# Patient Record
Sex: FEMALE | Race: WHITE | NOT HISPANIC OR LATINO | Employment: UNEMPLOYED | URBAN - METROPOLITAN AREA
[De-identification: names, ages, dates, MRNs, and addresses within clinical notes are randomized per-mention and may not be internally consistent; named-entity substitution may affect disease eponyms.]

---
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
## 2024-01-11 ENCOUNTER — OFFICE VISIT (OUTPATIENT)
Dept: OBGYN CLINIC | Facility: CLINIC | Age: 19
End: 2024-01-11
Payer: COMMERCIAL

## 2024-01-11 VITALS
BODY MASS INDEX: 21.83 KG/M2 | WEIGHT: 118.6 LBS | HEIGHT: 62 IN | DIASTOLIC BLOOD PRESSURE: 76 MMHG | SYSTOLIC BLOOD PRESSURE: 122 MMHG

## 2024-01-11 DIAGNOSIS — N92.0 MENORRHAGIA WITH REGULAR CYCLE: ICD-10-CM

## 2024-01-11 DIAGNOSIS — Z01.419 ENCOUNTER FOR GYNECOLOGICAL EXAMINATION WITHOUT ABNORMAL FINDING: Primary | ICD-10-CM

## 2024-01-11 DIAGNOSIS — Z00.00 HEALTHCARE MAINTENANCE: ICD-10-CM

## 2024-01-11 DIAGNOSIS — N94.6 DYSMENORRHEA: ICD-10-CM

## 2024-01-11 DIAGNOSIS — Z11.3 SCREENING EXAMINATION FOR STD (SEXUALLY TRANSMITTED DISEASE): ICD-10-CM

## 2024-01-11 PROCEDURE — 87591 N.GONORRHOEAE DNA AMP PROB: CPT | Performed by: NURSE PRACTITIONER

## 2024-01-11 PROCEDURE — 87491 CHLMYD TRACH DNA AMP PROBE: CPT | Performed by: NURSE PRACTITIONER

## 2024-01-11 PROCEDURE — 99385 PREV VISIT NEW AGE 18-39: CPT | Performed by: NURSE PRACTITIONER

## 2024-01-11 RX ORDER — IBUPROFEN 800 MG/1
800 TABLET ORAL EVERY 8 HOURS PRN
Qty: 45 TABLET | Refills: 3 | Status: SHIPPED | OUTPATIENT
Start: 2024-01-11

## 2024-01-11 NOTE — PROGRESS NOTES
Assessment / Plan    1. Encounter for gynecological examination without abnormal finding  Normal exam  Cervical cancer screening not yet indicated.  Agrees to G/C screening.  Does not require BC.      2. Screening examination for STD (sexually transmitted disease)    - Chlamydia/GC amplified DNA by PCR    3. Menorrhagia with regular cycle  Discussed option of hormonal suppression for management.  She is concerned about effect on her mood (h/o anxiety/ BPD).    Would like to try high dose NSAIDs to start.  Advised to start before period pain begins and to take around the clock for up to 5 days.  She will follow up if NSAIDs are not effective.    - ibuprofen (MOTRIN) 800 mg tablet; Take 1 tablet (800 mg total) by mouth every 8 (eight) hours as needed for mild pain  Dispense: 45 tablet; Refill: 3    4. Dysmenorrhea  As above    - ibuprofen (MOTRIN) 800 mg tablet; Take 1 tablet (800 mg total) by mouth every 8 (eight) hours as needed for mild pain  Dispense: 45 tablet; Refill: 3        Subjective      Fannie Yap is a 18 y.o. female who presents for her annual gynecologic exam.    19 yo G0, first GYN exam.    Last pap: n/a  Pap hx: n/a  STD screenin2023 G/C negative  STD hx: none  Sexually active: yes; female partner  Gardasil vaccine: completed    Periods are regular most of the time, sometimes irregular.  Current contraception: none  History of abnormal Pap smear: n/a  Family history of breast,uterine, ovarian or colon cancer: yes - mat great GM breast ca    Menstrual History:  OB History          0    Para   0    Term   0       0    AB   0    Living   0         SAB   0    IAB   0    Ectopic   0    Multiple   0    Live Births   0                  Patient's last menstrual period was 2024.       The following portions of the patient's history were reviewed and updated as appropriate: allergies, current medications, past family history, past medical history, past social history, past surgical  "history, and problem list.    Review of Systems      Review of Systems   Constitutional:  Negative for chills and fever.   Respiratory:  Negative for cough and shortness of breath.    Gastrointestinal:  Negative for abdominal distention, abdominal pain, blood in stool, constipation, diarrhea, nausea and vomiting.   Genitourinary:  Positive for menstrual problem (heavy and painful). Negative for difficulty urinating, dysuria, frequency, genital sores, hematuria, pelvic pain, urgency, vaginal bleeding and vaginal discharge.   Musculoskeletal:  Negative for arthralgias and myalgias.     Breasts:  Negative for skin changes, dimpling, asymmetry, nipple discharge, redness, tenderness or palpable masses    Objective      /76 (BP Location: Right arm, Patient Position: Sitting, Cuff Size: Standard)   Ht 5' 2\" (1.575 m)   Wt 53.8 kg (118 lb 9.6 oz)   LMP 01/11/2024   BMI 21.69 kg/m²   Physical Exam  Constitutional:       General: Gus is not in acute distress.     Appearance: Normal appearance. Gus is well-developed and normal weight. Gus is not ill-appearing or diaphoretic.   HENT:      Head: Normocephalic and atraumatic.   Eyes:      Pupils: Pupils are equal, round, and reactive to light.   Neck:      Thyroid: No thyromegaly.   Pulmonary:      Effort: Pulmonary effort is normal.   Chest:   Breasts:     Breasts are symmetrical.      Right: No inverted nipple, mass, nipple discharge, skin change or tenderness.      Left: No inverted nipple, mass, nipple discharge, skin change or tenderness.   Abdominal:      General: There is no distension.      Palpations: Abdomen is soft. There is no mass.      Tenderness: There is no abdominal tenderness. There is no guarding or rebound.   Genitourinary:     General: Normal vulva.      Exam position: Lithotomy position.      Labia:         Right: No rash, tenderness, lesion or injury.         Left: No rash, tenderness, lesion or injury.       Vagina: No signs of injury " and foreign body. Bleeding (copious menstrual flow) present. No vaginal discharge, erythema or tenderness.      Cervix: No cervical motion tenderness, discharge or friability.      Uterus: Not enlarged and not tender.       Adnexa:         Right: No mass or tenderness.          Left: No mass or tenderness.     Musculoskeletal:      Cervical back: Neck supple.   Lymphadenopathy:      Cervical: No cervical adenopathy.      Upper Body:      Right upper body: No supraclavicular adenopathy.      Left upper body: No supraclavicular adenopathy.   Skin:     General: Skin is warm and dry.   Neurological:      General: No focal deficit present.      Mental Status: Gus is alert and oriented to person, place, and time.   Psychiatric:         Mood and Affect: Mood normal.         Behavior: Behavior normal.         Thought Content: Thought content normal.         Judgment: Judgment normal.

## 2024-01-12 ENCOUNTER — APPOINTMENT (OUTPATIENT)
Dept: LAB | Age: 19
End: 2024-01-12
Payer: COMMERCIAL

## 2024-01-12 DIAGNOSIS — D50.9 IRON DEFICIENCY ANEMIA, UNSPECIFIED IRON DEFICIENCY ANEMIA TYPE: ICD-10-CM

## 2024-01-12 LAB
BASOPHILS # BLD AUTO: 0.03 THOUSANDS/ÂΜL (ref 0–0.1)
BASOPHILS NFR BLD AUTO: 0 % (ref 0–1)
EOSINOPHIL # BLD AUTO: 0.12 THOUSAND/ÂΜL (ref 0–0.61)
EOSINOPHIL NFR BLD AUTO: 2 % (ref 0–6)
ERYTHROCYTE [DISTWIDTH] IN BLOOD BY AUTOMATED COUNT: 17.2 % (ref 11.6–15.1)
HCT VFR BLD AUTO: 36.9 % (ref 36.5–46.1)
HGB BLD-MCNC: 12 G/DL (ref 12–15.4)
IMM GRANULOCYTES # BLD AUTO: 0.03 THOUSAND/UL (ref 0–0.2)
IMM GRANULOCYTES NFR BLD AUTO: 0 % (ref 0–2)
LYMPHOCYTES # BLD AUTO: 1.86 THOUSANDS/ÂΜL (ref 0.6–4.47)
LYMPHOCYTES NFR BLD AUTO: 27 % (ref 14–44)
MCH RBC QN AUTO: 28.7 PG (ref 26.8–34.3)
MCHC RBC AUTO-ENTMCNC: 32.5 G/DL (ref 31.4–37.4)
MCV RBC AUTO: 88 FL (ref 82–98)
MONOCYTES # BLD AUTO: 0.62 THOUSAND/ÂΜL (ref 0.17–1.22)
MONOCYTES NFR BLD AUTO: 9 % (ref 4–12)
NEUTROPHILS # BLD AUTO: 4.16 THOUSANDS/ÂΜL (ref 1.85–7.62)
NEUTS SEG NFR BLD AUTO: 62 % (ref 43–75)
NRBC BLD AUTO-RTO: 0 /100 WBCS
PLATELET # BLD AUTO: 223 THOUSANDS/UL (ref 149–390)
PMV BLD AUTO: 11.2 FL (ref 8.9–12.7)
RBC # BLD AUTO: 4.18 MILLION/UL (ref 3.88–5.12)
WBC # BLD AUTO: 6.82 THOUSAND/UL (ref 4.31–10.16)

## 2024-01-12 PROCEDURE — 85025 COMPLETE CBC W/AUTO DIFF WBC: CPT

## 2024-01-12 PROCEDURE — 82728 ASSAY OF FERRITIN: CPT

## 2024-01-12 PROCEDURE — 36415 COLL VENOUS BLD VENIPUNCTURE: CPT

## 2024-01-12 PROCEDURE — 83550 IRON BINDING TEST: CPT

## 2024-01-12 PROCEDURE — 83540 ASSAY OF IRON: CPT

## 2024-01-13 LAB
C TRACH DNA SPEC QL NAA+PROBE: NEGATIVE
FERRITIN SERPL-MCNC: 6 NG/ML (ref 24–307)
IRON SATN MFR SERPL: 9 % (ref 15–50)
IRON SERPL-MCNC: 29 UG/DL (ref 50–212)
N GONORRHOEA DNA SPEC QL NAA+PROBE: NEGATIVE
TIBC SERPL-MCNC: 336 UG/DL (ref 250–450)
UIBC SERPL-MCNC: 307 UG/DL (ref 155–355)

## 2024-01-18 ENCOUNTER — OFFICE VISIT (OUTPATIENT)
Dept: FAMILY MEDICINE CLINIC | Facility: CLINIC | Age: 19
End: 2024-01-18
Payer: COMMERCIAL

## 2024-01-18 VITALS
WEIGHT: 116 LBS | TEMPERATURE: 97.5 F | SYSTOLIC BLOOD PRESSURE: 106 MMHG | BODY MASS INDEX: 21.35 KG/M2 | HEIGHT: 62 IN | RESPIRATION RATE: 16 BRPM | HEART RATE: 86 BPM | DIASTOLIC BLOOD PRESSURE: 64 MMHG | OXYGEN SATURATION: 98 %

## 2024-01-18 DIAGNOSIS — D50.9 IRON DEFICIENCY ANEMIA, UNSPECIFIED IRON DEFICIENCY ANEMIA TYPE: ICD-10-CM

## 2024-01-18 DIAGNOSIS — R73.9 HYPERGLYCEMIA: ICD-10-CM

## 2024-01-18 DIAGNOSIS — F41.9 ANXIETY: Primary | ICD-10-CM

## 2024-01-18 DIAGNOSIS — F31.9 DEPRESSED BIPOLAR DISORDER (HCC): ICD-10-CM

## 2024-01-18 PROCEDURE — 99214 OFFICE O/P EST MOD 30 MIN: CPT | Performed by: NURSE PRACTITIONER

## 2024-01-18 RX ORDER — FLUOXETINE 10 MG/1
10 CAPSULE ORAL DAILY
Qty: 30 CAPSULE | Refills: 1 | Status: SHIPPED | OUTPATIENT
Start: 2024-01-18

## 2024-01-18 RX ORDER — FERROUS SULFATE 324(65)MG
324 TABLET, DELAYED RELEASE (ENTERIC COATED) ORAL EVERY OTHER DAY
Qty: 45 TABLET | Refills: 3 | Status: SHIPPED | OUTPATIENT
Start: 2024-01-18 | End: 2024-04-17

## 2024-01-18 NOTE — ASSESSMENT & PLAN NOTE
- Improving. Advised she can take ferrous sulfate every other day.  - Will continue to monitor CBC and iron panel.

## 2024-01-18 NOTE — ASSESSMENT & PLAN NOTE
- Well controlled on lamictal 25 mg daily. Continue same.   - She is trying to get in with new Psychiatrist.  - Will continue to monitor.

## 2024-01-18 NOTE — ASSESSMENT & PLAN NOTE
- Last hemoglobin A1c elevated at 5.8. Recommended low cab diet and regular exercise. Will continue to monitor.

## 2024-01-18 NOTE — PROGRESS NOTES
Name: Fannie Yap      : 2005      MRN: [id number]  Encounter Provider: MEAGAN Kapoor  Encounter Date: 2024   Encounter department: [facility] [address] [facility]    Assessment & Plan     1. Anxiety  Assessment & Plan:  - Not well controlled.  - Will start patient on Prozac 10 mg daily. Discussed side effects.  - She is trying to get in with new Psychiatrist.   - Recommend follow up in 1 month.     Orders:  -     FLUoxetine (PROzac) 10 mg capsule; Take 1 capsule (10 mg total) by mouth daily    2. Depressed bipolar disorder (HCC)  Assessment & Plan:  - Well controlled on lamictal 25 mg daily. Continue same.   - She is trying to get in with new Psychiatrist.  - Will continue to monitor.       3. Iron deficiency anemia, unspecified iron deficiency anemia type  Assessment & Plan:  - Improving. Advised she can take ferrous sulfate every other day.  - Will continue to monitor CBC and iron panel.     Orders:  -     ferrous sulfate 324 (65 Fe) mg; Take 1 tablet (324 mg total) by mouth every other day    4. Hyperglycemia  Assessment & Plan:  - Last hemoglobin A1c elevated at 5.8. Recommended low cab diet and regular exercise. Will continue to monitor.     Orders:  -     Hemoglobin A1C; Future           Subjective     Patient presents to office today for follow up. She had her blood work done which showed iron deficiency but no anemia. She has complaints today of increased anxiety. States she has had anxiety since grade school but was never treated. She used to see a psychiatrist for her bipolar disorder but the office closed. She is looking for a new psychiatrist. She is currently on lamictal which she feels controls her bipolar symptoms well. Denies any other concerns or complaints today.         Review of Systems   Constitutional:  Negative for fatigue and fever.   HENT:  Negative for trouble swallowing.    Eyes:  Negative for visual disturbance.   Respiratory:  Negative for cough and  shortness of breath.    Cardiovascular:  Negative for chest pain and palpitations.   Gastrointestinal:  Negative for abdominal pain and blood in stool.   Endocrine: Negative for cold intolerance and heat intolerance.   Genitourinary:  Negative for difficulty urinating and dysuria.   Musculoskeletal:  Negative for gait problem.   Skin:  Negative for rash.   Neurological:  Negative for dizziness, syncope and headaches.   Hematological:  Negative for adenopathy.   Psychiatric/Behavioral:  Negative for behavioral problems. The patient is nervous/anxious.        Past Medical History:   Diagnosis Date   • Allergic    • Anxiety     take lamictal for mood stabilization   • Bipolar affective disorder (HCC)    • Chronic headaches      Past Surgical History:   Procedure Laterality Date   • TONSILLECTOMY       Family History   Problem Relation Age of Onset   • Depression Mother    • Breast cancer Other    • Colon cancer Neg Hx    • Ovarian cancer Neg Hx    • Uterine cancer Neg Hx      Social History     Socioeconomic History   • Marital status: Single     Spouse name: None   • Number of children: None   • Years of education: None   • Highest education level: None   Occupational History   • None   Tobacco Use   • Smoking status: Never   • Smokeless tobacco: Never   Vaping Use   • Vaping status: Never Used   Substance and Sexual Activity   • Alcohol use: Never   • Drug use: Never   • Sexual activity: Yes     Partners: Female     Birth control/protection: None     Comment: one partner; 2 mo relationship   Other Topics Concern   • None   Social History Narrative   • None     Social Determinants of Health     Financial Resource Strain: Not on file   Food Insecurity: Not on file   Transportation Needs: Not on file   Physical Activity: Not on file   Stress: Not on file   Social Connections: Not on file   Intimate Partner Violence: Not on file   Housing Stability: Not on file     Current Outpatient Medications on File Prior to Visit  "  Medication Sig   • ibuprofen (MOTRIN) 800 mg tablet Take 1 tablet (800 mg total) by mouth every 8 (eight) hours as needed for mild pain   • lamoTRIgine (LaMICtal) 25 mg tablet Take 1 tablet (25 mg total) by mouth daily   • ascorbic acid (VITAMIN C) 500 MG TBCR Take 1 tablet (500 mg total) by mouth daily (Patient not taking: Reported on 1/11/2024)   • [DISCONTINUED] ferrous sulfate 324 (65 Fe) mg Take 1 tablet (324 mg total) by mouth 2 (two) times a day before meals (Patient not taking: Reported on 1/11/2024)     Allergies   Allergen Reactions   • No Active Allergies      Immunization History   Administered Date(s) Administered   • COVID-19 PFIZER VACCINE 0.3 ML IM 07/28/2021, 08/18/2021   • DTaP 02/07/2005, 05/08/2005, 08/06/2005, 02/21/2008, 08/31/2010   • HPV9 04/13/2018, 11/14/2019   • Hep A, ped/adol, 2 dose 01/12/2016, 04/13/2018   • Hep B, Adolescent or Pediatric 02/07/2005, 06/11/2005, 01/12/2016   • Hepatitis A 01/12/2016, 04/13/2018   • Hib (PRP-T) 03/04/2006   • IPV 02/07/2005, 05/08/2005, 06/02/2006, 08/21/2010   • Influenza, seasonal, injectable 01/03/2013   • MMR 03/04/2006, 08/31/2010   • Meningococcal MCV4, Unspecified 01/12/2016   • Meningococcal MCV4P 01/12/2016, 02/25/2021   • Tdap 01/12/2016   • Varicella 11/28/2006, 08/31/2010       Objective     /64 (BP Location: Left arm, Patient Position: Sitting, Cuff Size: Adult)   Pulse 86   Temp 97.5 °F (36.4 °C) (Tympanic)   Resp 16   Ht 5' 2\" (1.575 m)   Wt 52.6 kg (116 lb)   LMP 01/11/2024   SpO2 98%   BMI 21.22 kg/m²     Physical Exam  Vitals and nursing note reviewed.   Constitutional:       General: Gus is not in acute distress.     Appearance: Normal appearance. Gus is not ill-appearing.   HENT:      Head: Normocephalic and atraumatic.      Right Ear: External ear normal.      Left Ear: External ear normal.   Eyes:      Conjunctiva/sclera: Conjunctivae normal.   Cardiovascular:      Rate and Rhythm: Normal rate and regular " rhythm.      Heart sounds: Normal heart sounds.   Pulmonary:      Effort: Pulmonary effort is normal.      Breath sounds: Normal breath sounds.   Musculoskeletal:         General: Normal range of motion.      Cervical back: Normal range of motion.   Skin:     General: Skin is warm and dry.   Neurological:      Mental Status: Gus is alert and oriented to person, place, and time.   Psychiatric:         Mood and Affect: Mood normal.         Behavior: Behavior normal.       MEAGAN Kapoor

## 2024-01-18 NOTE — ASSESSMENT & PLAN NOTE
- Not well controlled.  - Will start patient on Prozac 10 mg daily. Discussed side effects.  - She is trying to get in with new Psychiatrist.   - Recommend follow up in 1 month.

## 2024-02-08 ENCOUNTER — OFFICE VISIT (OUTPATIENT)
Dept: FAMILY MEDICINE CLINIC | Facility: CLINIC | Age: 19
End: 2024-02-08
Payer: COMMERCIAL

## 2024-02-08 ENCOUNTER — APPOINTMENT (OUTPATIENT)
Dept: LAB | Age: 19
End: 2024-02-08
Payer: COMMERCIAL

## 2024-02-08 VITALS
BODY MASS INDEX: 21.71 KG/M2 | RESPIRATION RATE: 16 BRPM | TEMPERATURE: 98.2 F | OXYGEN SATURATION: 100 % | DIASTOLIC BLOOD PRESSURE: 58 MMHG | HEART RATE: 88 BPM | HEIGHT: 62 IN | SYSTOLIC BLOOD PRESSURE: 106 MMHG | WEIGHT: 118 LBS

## 2024-02-08 DIAGNOSIS — F31.9 DEPRESSED BIPOLAR DISORDER (HCC): ICD-10-CM

## 2024-02-08 DIAGNOSIS — F41.9 ANXIETY: ICD-10-CM

## 2024-02-08 DIAGNOSIS — R73.9 HYPERGLYCEMIA: ICD-10-CM

## 2024-02-08 DIAGNOSIS — R30.0 DYSURIA: Primary | ICD-10-CM

## 2024-02-08 DIAGNOSIS — R30.0 DYSURIA: ICD-10-CM

## 2024-02-08 LAB
BACTERIA UR QL AUTO: ABNORMAL /HPF
BILIRUB UR QL STRIP: NEGATIVE
CLARITY UR: CLEAR
COLOR UR: YELLOW
EST. AVERAGE GLUCOSE BLD GHB EST-MCNC: 103 MG/DL
GLUCOSE UR STRIP-MCNC: NEGATIVE MG/DL
HBA1C MFR BLD: 5.2 %
HGB UR QL STRIP.AUTO: ABNORMAL
KETONES UR STRIP-MCNC: NEGATIVE MG/DL
LEUKOCYTE ESTERASE UR QL STRIP: NEGATIVE
NITRITE UR QL STRIP: NEGATIVE
NON-SQ EPI CELLS URNS QL MICRO: ABNORMAL /HPF
PH UR STRIP.AUTO: 6.5 [PH]
PROT UR STRIP-MCNC: ABNORMAL MG/DL
RBC #/AREA URNS AUTO: ABNORMAL /HPF
SP GR UR STRIP.AUTO: 1.02 (ref 1–1.03)
UROBILINOGEN UR STRIP-ACNC: <2 MG/DL
WBC #/AREA URNS AUTO: ABNORMAL /HPF

## 2024-02-08 PROCEDURE — 81001 URINALYSIS AUTO W/SCOPE: CPT

## 2024-02-08 PROCEDURE — 87086 URINE CULTURE/COLONY COUNT: CPT

## 2024-02-08 PROCEDURE — 83036 HEMOGLOBIN GLYCOSYLATED A1C: CPT

## 2024-02-08 PROCEDURE — 36415 COLL VENOUS BLD VENIPUNCTURE: CPT

## 2024-02-08 PROCEDURE — 99214 OFFICE O/P EST MOD 30 MIN: CPT | Performed by: NURSE PRACTITIONER

## 2024-02-08 RX ORDER — NITROFURANTOIN 25; 75 MG/1; MG/1
100 CAPSULE ORAL 2 TIMES DAILY
Qty: 10 CAPSULE | Refills: 0 | Status: SHIPPED | OUTPATIENT
Start: 2024-02-08 | End: 2024-02-13

## 2024-02-08 NOTE — ASSESSMENT & PLAN NOTE
- Prescription sent for Macrobid. Advised of side effects.  - Will obtain UA and culture. Advised to complete urine samples before starting antibiotic.   - Increase oral hydration.   - Will continue to follow up.

## 2024-02-08 NOTE — ASSESSMENT & PLAN NOTE
- Slightly improved.  - Continue Prozac 10 mg daily.   - Referred to Psychiatrist.  - Recommend follow up in 1 month.

## 2024-02-08 NOTE — ASSESSMENT & PLAN NOTE
- Well controlled on lamictal 25 mg daily. Continue same.   - Referred to Psych.   - Will continue to monitor.

## 2024-02-08 NOTE — PROGRESS NOTES
Name: Fannie Yap      : 2005      MRN: [id number]  Encounter Provider: MEAGAN Kapoor  Encounter Date: 2024   Encounter department: [facility]    Assessment & Plan     1. Dysuria  Assessment & Plan:  - Prescription sent for Macrobid. Advised of side effects.  - Will obtain UA and culture. Advised to complete urine samples before starting antibiotic.   - Increase oral hydration.   - Will continue to follow up.     Orders:  -     UA w Reflex to Microscopic w Reflex to Culture; Future  -     Urine culture; Future  -     nitrofurantoin (MACROBID) 100 mg capsule; Take 1 capsule (100 mg total) by mouth 2 (two) times a day for 5 days    2. Anxiety  Assessment & Plan:  - Slightly improved.  - Continue Prozac 10 mg daily.   - Referred to Psychiatrist.  - Recommend follow up in 1 month.       3. Depressed bipolar disorder (HCC)  Assessment & Plan:  - Well controlled on lamictal 25 mg daily. Continue same.   - Referred to Psych.   - Will continue to monitor.              Subjective     Patient presents to office today with complaints of dysuria, frequency, and incomplete bladder emptying. Symptoms have been occurring for about 2-3 weeks. She denies any fever, chills, or flank pain.         Review of Systems   Constitutional:  Negative for chills, fatigue and fever.   HENT:  Negative for trouble swallowing.    Eyes:  Negative for visual disturbance.   Respiratory:  Negative for cough and shortness of breath.    Cardiovascular:  Negative for chest pain and palpitations.   Gastrointestinal:  Negative for abdominal pain and blood in stool.   Endocrine: Negative for cold intolerance and heat intolerance.   Genitourinary:  Positive for decreased urine volume, dysuria and frequency. Negative for difficulty urinating and flank pain.   Musculoskeletal:  Negative for gait problem.   Skin:  Negative for rash.   Neurological:  Negative for dizziness, syncope and headaches.   Hematological:   Negative for adenopathy.   Psychiatric/Behavioral:  Negative for behavioral problems.        Past Medical History:   Diagnosis Date    Allergic     Anxiety     take lamictal for mood stabilization    Bipolar affective disorder (HCC)     Chronic headaches      Past Surgical History:   Procedure Laterality Date    TONSILLECTOMY       Family History   Problem Relation Age of Onset    Depression Mother     Breast cancer Other     Colon cancer Neg Hx     Ovarian cancer Neg Hx     Uterine cancer Neg Hx      Social History     Socioeconomic History    Marital status: Single     Spouse name: None    Number of children: None    Years of education: None    Highest education level: None   Occupational History    None   Tobacco Use    Smoking status: Never    Smokeless tobacco: Never   Vaping Use    Vaping status: Never Used   Substance and Sexual Activity    Alcohol use: Never    Drug use: Never    Sexual activity: Yes     Partners: Female     Birth control/protection: None     Comment: one partner; 2 mo relationship   Other Topics Concern    None   Social History Narrative    None     Social Determinants of Health     Financial Resource Strain: Not on file   Food Insecurity: Not on file   Transportation Needs: Not on file   Physical Activity: Not on file   Stress: Not on file   Social Connections: Not on file   Intimate Partner Violence: Not on file   Housing Stability: Not on file     Current Outpatient Medications on File Prior to Visit   Medication Sig    ferrous sulfate 324 (65 Fe) mg Take 1 tablet (324 mg total) by mouth every other day    FLUoxetine (PROzac) 10 mg capsule Take 1 capsule (10 mg total) by mouth daily    ibuprofen (MOTRIN) 800 mg tablet Take 1 tablet (800 mg total) by mouth every 8 (eight) hours as needed for mild pain    lamoTRIgine (LaMICtal) 25 mg tablet Take 1 tablet (25 mg total) by mouth daily    ascorbic acid (VITAMIN C) 500 MG TBCR Take 1 tablet (500 mg total) by mouth daily (Patient not taking:  "Reported on 1/11/2024)     Allergies   Allergen Reactions    No Active Allergies      Immunization History   Administered Date(s) Administered    COVID-19 PFIZER VACCINE 0.3 ML IM 07/28/2021, 08/18/2021    DTaP 02/07/2005, 05/08/2005, 08/06/2005, 02/21/2008, 08/31/2010    HPV9 04/13/2018, 11/14/2019    Hep A, ped/adol, 2 dose 01/12/2016, 04/13/2018    Hep B, Adolescent or Pediatric 02/07/2005, 06/11/2005, 01/12/2016    Hepatitis A 01/12/2016, 04/13/2018    Hib (PRP-T) 03/04/2006    IPV 02/07/2005, 05/08/2005, 06/02/2006, 08/21/2010    Influenza, seasonal, injectable 01/03/2013    MMR 03/04/2006, 08/31/2010    Meningococcal MCV4, Unspecified 01/12/2016    Meningococcal MCV4P 01/12/2016, 02/25/2021    Tdap 01/12/2016    Varicella 11/28/2006, 08/31/2010       Objective     /58 (BP Location: Left arm, Patient Position: Sitting, Cuff Size: Adult)   Pulse 88   Temp 98.2 °F (36.8 °C) (Tympanic)   Resp 16   Ht 5' 2\" (1.575 m)   Wt 53.5 kg (118 lb)   LMP 01/11/2024   SpO2 100%   BMI 21.58 kg/m²     Physical Exam  Vitals and nursing note reviewed.   Constitutional:       General: Gus is not in acute distress.     Appearance: Normal appearance. Gus is not ill-appearing.   HENT:      Head: Normocephalic and atraumatic.      Right Ear: External ear normal.      Left Ear: External ear normal.   Eyes:      Conjunctiva/sclera: Conjunctivae normal.   Cardiovascular:      Rate and Rhythm: Normal rate and regular rhythm.      Heart sounds: Normal heart sounds.   Pulmonary:      Effort: Pulmonary effort is normal.      Breath sounds: Normal breath sounds.   Musculoskeletal:         General: Normal range of motion.      Cervical back: Normal range of motion.   Skin:     General: Skin is warm and dry.   Neurological:      Mental Status: Gus is alert and oriented to person, place, and time.   Psychiatric:         Mood and Affect: Mood normal.         Behavior: Behavior normal.       Jennifer M Graffin, CRNP    "

## 2024-02-09 LAB — BACTERIA UR CULT: NORMAL

## 2024-02-12 ENCOUNTER — TELEPHONE (OUTPATIENT)
Dept: FAMILY MEDICINE CLINIC | Facility: CLINIC | Age: 19
End: 2024-02-12

## 2024-02-12 NOTE — TELEPHONE ENCOUNTER
----- Message from MEAGAN Kapoor sent at 2/9/2024  2:09 PM EST -----  Urine culture negative for UTI. She can stop antibiotic.

## 2024-02-12 NOTE — TELEPHONE ENCOUNTER
I lm for pt to check her my chart for results and left detailed message to check her my chart and call with any questions

## 2024-02-21 PROBLEM — Z00.129 WELL ADOLESCENT VISIT: Status: RESOLVED | Noted: 2019-11-13 | Resolved: 2024-02-21

## 2024-03-05 ENCOUNTER — TELEPHONE (OUTPATIENT)
Dept: PSYCHIATRY | Facility: CLINIC | Age: 19
End: 2024-03-05

## 2024-03-05 NOTE — TELEPHONE ENCOUNTER
Patient has been added to the Medication Management and Talk Therapy wait list without a referral.    Insurance: [employer]  Insurance Type:    Commercial []   Medicaid []   [facility] (if applicable)   Medicare [x]  Location Preference: [place]  Provider Preference: Female  Virtual: Yes [x] No []  Were outside resources sent: Yes [] No [x]    Presenting problem: bipolar

## 2024-03-06 ENCOUNTER — OFFICE VISIT (OUTPATIENT)
Dept: FAMILY MEDICINE CLINIC | Facility: CLINIC | Age: 19
End: 2024-03-06
Payer: COMMERCIAL

## 2024-03-06 VITALS
RESPIRATION RATE: 16 BRPM | HEIGHT: 62 IN | OXYGEN SATURATION: 99 % | DIASTOLIC BLOOD PRESSURE: 64 MMHG | WEIGHT: 120 LBS | BODY MASS INDEX: 22.08 KG/M2 | SYSTOLIC BLOOD PRESSURE: 112 MMHG | TEMPERATURE: 97.4 F | HEART RATE: 91 BPM

## 2024-03-06 DIAGNOSIS — F31.9 DEPRESSED BIPOLAR DISORDER (HCC): Primary | ICD-10-CM

## 2024-03-06 DIAGNOSIS — F41.9 ANXIETY: ICD-10-CM

## 2024-03-06 DIAGNOSIS — K21.9 GASTROESOPHAGEAL REFLUX DISEASE, UNSPECIFIED WHETHER ESOPHAGITIS PRESENT: ICD-10-CM

## 2024-03-06 DIAGNOSIS — M54.50 ACUTE BILATERAL LOW BACK PAIN WITHOUT SCIATICA: ICD-10-CM

## 2024-03-06 PROCEDURE — 99214 OFFICE O/P EST MOD 30 MIN: CPT | Performed by: NURSE PRACTITIONER

## 2024-03-06 RX ORDER — MELOXICAM 7.5 MG/1
7.5 TABLET ORAL DAILY PRN
Qty: 30 TABLET | Refills: 3 | Status: SHIPPED | OUTPATIENT
Start: 2024-03-06 | End: 2024-04-05

## 2024-03-06 RX ORDER — LAMOTRIGINE 25 MG/1
50 TABLET ORAL DAILY
Qty: 60 TABLET | Refills: 0 | Status: SHIPPED | OUTPATIENT
Start: 2024-03-06 | End: 2024-04-05

## 2024-03-06 RX ORDER — PANTOPRAZOLE SODIUM 20 MG/1
20 TABLET, DELAYED RELEASE ORAL
Qty: 30 TABLET | Refills: 1 | Status: SHIPPED | OUTPATIENT
Start: 2024-03-06 | End: 2024-09-02

## 2024-03-06 NOTE — ASSESSMENT & PLAN NOTE
- Not well controlled.  - Prescription sent for pantoprazole 20 mg daily before breakfast.   - Avoid triggering food and beverage.  - Recommend follow up in 1 month.

## 2024-03-06 NOTE — ASSESSMENT & PLAN NOTE
- Stable.  - Continue Prozac 10 mg daily.   - She is trying to get in with Psychiatrist.   - Recommend follow up in 1 month.

## 2024-03-06 NOTE — PATIENT INSTRUCTIONS
Core Strengthening Exercises   WHAT YOU NEED TO KNOW:   Your core includes the muscles of your lower back, hip, pelvis, and abdomen. Core strengthening exercises help heal and strengthen these muscles. This helps prevent another injury, and keeps your pelvis, spine, and hips in the correct position.  DISCHARGE INSTRUCTIONS:   Call your doctor or physical therapist if:   You have sharp or worsening pain during exercise or at rest.    You have questions or concerns about your shoulder exercises.    Safety tips:  Talk to your healthcare provider before you start an exercise program. A physical therapist can teach you how to do core strengthening exercises safely.  Do the exercises on a mat or firm surface.  A firm surface will support your spine and prevent low back pain. Do not do these exercises on a bed.    Move slowly and smoothly.  Avoid fast or jerky motions.    Stop if you feel pain.  You may feel some discomfort at first, but you should not feel pain. Tell your provider or physical therapist if you have pain while you exercise. Regular exercise will help decrease your discomfort over time.    Breathe normally during core exercises.  Do not hold your breath. This may cause an increase in blood pressure and prevent muscle strengthening. Your healthcare provider will tell you when to inhale and exhale during the exercise.    Begin all of your exercises with abdominal bracing.  Abdominal bracing helps warm up your core muscles. You can also practice abdominal bracing throughout the day. Lie on your back with your knees bent and feet flat on the floor. Place your arms in a relaxed position beside your body. Tighten your abdominal muscles. Pull your belly button in and up toward your spine. Hold for 5 seconds. Relax your muscles. Repeat 10 times.       Core strengthening exercises:  Your healthcare provider will tell you how often to do these exercises. The provider will also tell you how many repetitions of each  exercise you should do. Hold each exercise for 5 seconds or as directed. As you get stronger, increase your hold to 10 to 15 seconds. You can do some of these exercises on a stability ball, or with a weight. Ask your healthcare provider how to use a stability ball or weight for these exercises:  Bridging:  Lie on your back with your knees bent and feet flat on the floor. Rest your arms at your side. Tighten your buttocks, and then lift your hips 1 inch off the floor. Hold for 5 seconds. When you can do this exercise without pain for 10 seconds, increase the distance you lift your hips. A good goal is to be able to lift your hips so that your shoulders, hips, and knees are in a straight line.         Dead bug:  Lie on your back with your knees bent and feet flat on the floor. Place your arms in a relaxed position beside your body. Begin with abdominal bracing. Next, raise one leg, keeping your knee bent. Hold for 5 seconds. Repeat with the other leg. When you can do this exercise without pain for 10 to 15 seconds, you may raise one straight leg and hold. Repeat with the other leg.         Quadruped:  Place your hands and knees on the floor. Keep your wrists directly below your shoulders and your knees directly below your hips. Pull your belly button in toward your spine. Do not flatten or arch your back. Tighten your abdominal muscles below your belly button. Hold for 5 seconds. When you can do this exercise without pain for 10 to 15 seconds, you may extend one arm and hold. Repeat on the other side.         Side bridge exercises:      Standing side bridge:  Stand next to a wall and extend one arm toward the wall. Place your palm flat on the wall with your fingers pointing upward. Begin with abdominal bracing. Next, without moving your feet, slowly bend your arm to 90 degrees. Hold for 5 seconds. Repeat on the other side. When you can do this exercise without pain for 10 to 15 seconds, you may do the bent leg side  bridge on the floor.         Bent leg side bridge:  Lie on one side with your legs, hips, and shoulders in a straight line. Prop yourself up onto your forearm so your elbow is directly below your shoulder. Bend your knees back to 90 degrees. Begin with abdominal bracing. Next, lift your hips and balance yourself on your forearm and knees. Hold for 5 seconds. Repeat on the other side. When you can do this exercise without pain for 10 to 15 seconds, you may do the straight leg side bridge on the floor.         Straight leg side bridge:  Lie on one side with your legs, hips, and shoulders in a straight line. Prop yourself up onto your forearm so your elbow is directly below your shoulder. Begin with abdominal bracing. Lift your hips off the floor and balance yourself on your forearm and the outside of your flexed foot. Do not let your ankle bend sideways. Hold for 5 seconds. Repeat on the other side. When you can do this exercise without pain for 10 to 15 seconds, ask your healthcare provider for more advanced exercises.       Superman:  Lie on your stomach. Extend your arms forward on the floor. Tighten your abdominal muscles and lift your right hand and left leg off the floor. Hold this position. Slowly return to the starting position. Tighten your abdominal muscles and lift your left hand and right leg off the floor. Hold this position. Slowly return to the starting position.         Clam:  Lie on your side with your knees bent. Put your bottom arm under your head to keep your neck in line. Put your top hand on your hip to keep your pelvis from moving. Put your heels together, and keep them together during this exercise. Slowly raise your top knee toward the ceiling. Then lower your leg so your knees are together. Repeat this exercise 10 times. Then switch sides and do the exercise 10 times with the other leg.         Curl up:  Lie on your back with your knees bent and feet flat on the floor. Place your hands, palms  down, underneath your lower back. Next, with your elbows on the floor, lift your shoulders and chest 2 to 3 inches off the floor. Keep your head in line with your shoulders. Hold this position. Slowly return to the starting position.         Straight leg raises:  Lie on your back with one leg straight. Bend the other knee and place your foot flat on the floor. Tighten your abdominal muscles. Keep your leg straight and slowly lift it straight up 6 to 12 inches off the floor. Hold this position. Lower your leg slowly. Do as many repetitions as directed on this side. Repeat with the other leg.         Plank:  Lie on your stomach. Bend your elbows and place your forearms flat on the floor. Lift your chest, stomach, and knees off the floor. Make sure your elbows are below your shoulders. Your body should be in a straight line. Do not let your hips or lower back sink to the ground. Squeeze your abdominal muscles together and hold for 15 seconds. To make this exercise harder, hold for 30 seconds or lift 1 leg at a time.         Bicycles:  Lie on your back. Bend both knees and bring them toward your chest. Your calves should be parallel to the floor. Place the palms of your hands on the back of your head. Straighten your right leg and keep it lifted 2 inches off the floor. Raise your head and shoulders off the floor and twist towards your left. Keep your head and shoulders lifted. Bend your right knee while you straighten your left leg. Keep your left leg 2 inches off the floor. Twist your head and chest towards the left leg. Continue to straighten 1 leg at a time and twist.       Follow up with your doctor or physical therapist as directed:  Write down your questions so you remember to ask them during your visits.  © Copyright [employer] 2023 Information is for End User's use only and may not be sold, redistributed or otherwise used for commercial purposes.  The above information is an  only. It is not  intended as medical advice for individual conditions or treatments. Talk to your doctor, nurse or pharmacist before following any medical regimen to see if it is safe and effective for you.  Lower Back Exercises   WHAT YOU NEED TO KNOW:   Lower back exercises help heal and strengthen your back muscles to prevent another injury. Ask your healthcare provider if you need to see a physical therapist for more advanced exercises.   DISCHARGE INSTRUCTIONS:   Return to the emergency department if:   You have severe pain that prevents you from moving.       Call your doctor if:   Your pain becomes worse.    You have new pain.    You have questions or concerns about your condition or care.    Do lower back exercises safely:   Do the exercises on a mat or firm surface (not on a bed).  A firm surface will support your spine and prevent low back pain.    Move slowly and smoothly.  Avoid fast or jerky motions.    Breathe normally.  Do not hold your breath.    Stop if you feel pain.  It is normal to feel some discomfort at first, but you should not feel pain. Regular exercise will help decrease your discomfort over time.    Lower back exercises:  Your healthcare provider may recommend that you do back exercises 10 to 30 minutes each day. He or she may also recommend that you do exercises 1 to 3 times each day. Ask your provider which exercises are best for you and how often to do them.  Ankle pumps:  Lie on your back. Move your foot up (with your toes pointing toward your head). Then, move your foot down (with your toes pointing away from you). Repeat this exercise 10 times on each side.         Heel slides:  Lie on your back. Slowly bend one leg and then straighten it. Next, bend the other leg and then straighten it. Repeat 10 times on each side.         Pelvic tilt:  Lie on your back with your knees bent and feet flat on the floor. Place your arms in a relaxed position beside your body. Tighten the muscles of your abdomen and  flatten your back against the floor. Hold for 5 seconds. Repeat 5 times.         Back stretch:  Lie on your back with your hands behind your head. Bend your knees and turn the lower half of your body to one side. Hold this position for 10 seconds. Repeat 3 times on each side.         Straight leg raises:  Lie on your back with one leg straight. Bend the other knee. Tighten your abdomen and then slowly lift the straight leg up about 6 to 12 inches off the floor. Hold for 1 to 5 seconds. Lower your leg slowly. Repeat 10 times on each leg.         Knee-to-chest:  Lie on your back with your knees bent and feet flat on the floor. Pull one of your knees toward your chest and hold it there for 5 seconds. Return your leg to the starting position. Lift the other knee toward your chest and hold for 5 seconds. Do this 5 times on each side.         Cat and camel:  Place your hands and knees on the floor. Arch your back upward toward the ceiling and lower your head. Round out your spine as much as you can. Hold for 5 seconds. Lift your head upward and push your chest downward toward the floor. Hold for 5 seconds. Do 3 sets or as directed.         Wall squats:  Stand with your back against a wall. Tighten the muscles of your abdomen. Slowly lower your body until your knees are bent at a 45 degree angle. Hold this position for 5 seconds. Slowly move back up to a standing position. Repeat 10 times.         Curl up:  Lie on your back with your knees bent and feet flat on the floor. Place your hands, palms down, underneath the curve in your lower back. Next, with your elbows on the floor, lift your shoulders and chest 2 to 3 inches. Keep your head in line with your shoulders. Hold this position for 5 seconds. When you can do this exercise without pain for 10 to 15 seconds, you may add a rotation. While your shoulders and chest are lifted off the ground, turn slightly to the left and hold. Repeat on the other side.         Bird dog:   Place your hands and knees on the floor. Keep your wrists directly below your shoulders and your knees directly below your hips. Pull your belly button in toward your spine. Do not flatten or arch your back. Tighten your abdominal muscles. Raise one arm straight out so that it is aligned with your head. Next, raise the leg opposite your arm. Hold this position for 15 seconds. Lower your arm and leg slowly and change sides. Do 5 sets.       Follow up with your doctor as directed:  Write down your questions so you remember to ask them during your visits.  © Copyright [employer] 2023 Information is for End User's use only and may not be sold, redistributed or otherwise used for commercial purposes.  The above information is an  only. It is not intended as medical advice for individual conditions or treatments. Talk to your doctor, nurse or pharmacist before following any medical regimen to see if it is safe and effective for you.

## 2024-03-06 NOTE — PROGRESS NOTES
Name: Fannie Yap      : 2005      MRN: [id number]  Encounter Provider: MEAGAN Kapoor  Encounter Date: 3/6/2024   Encounter department: [facility] [address] [facility]    Assessment & Plan     1. Depressed bipolar disorder (HCC)  Assessment & Plan:  - Not well controlled.  - Will increase Lamictal to 50 mg daily.   - Continue Prozac 10 mg daily.   - She is trying to get in to see Psychiatrist.  - Recommend follow up in 1 month.     Orders:  -     lamoTRIgine (LaMICtal) 25 mg tablet; Take 2 tablets (50 mg total) by mouth daily    2. Anxiety  Assessment & Plan:  - Stable.  - Continue Prozac 10 mg daily.   - She is trying to get in with Psychiatrist.   - Recommend follow up in 1 month.       3. Gastroesophageal reflux disease, unspecified whether esophagitis present  Assessment & Plan:  - Not well controlled.  - Prescription sent for pantoprazole 20 mg daily before breakfast.   - Avoid triggering food and beverage.  - Recommend follow up in 1 month.     Orders:  -     pantoprazole (PROTONIX) 20 mg tablet; Take 1 tablet (20 mg total) by mouth daily before breakfast    4. Acute bilateral low back pain without sciatica  Assessment & Plan:  - Prescription sent for meloxicam as needed. Discussed side effects.  - Recommend home exercises.  - Will continue to monitor.     Orders:  -     meloxicam (MOBIC) 7.5 mg tablet; Take 1 tablet (7.5 mg total) by mouth daily as needed for moderate pain           Subjective     Patient presents today for follow up of anxiety and depression. She was started on prozac last visit. Feels like it is slightly helping but also complains of increased depression symptoms. She is trying to get in to see a new psychiatrist. Also complains of aching bilateral lower back pain. Pain is occurring all day long. Laying down makes her pain worse. She can't lay flat on her back. Sleeping with a body pillow helps. She denies any numbness or tingling. Denies any loss of bowel or  bladder function. Also complains of nausea especially after eating. Denies any abdominal pain. Has been occurring for a few months. Also reports acid reflux. Denies any other concerns or complaints today.         Review of Systems   Constitutional:  Negative for fatigue and fever.   HENT:  Negative for trouble swallowing.    Eyes:  Negative for visual disturbance.   Respiratory:  Negative for cough and shortness of breath.    Cardiovascular:  Negative for chest pain and palpitations.   Gastrointestinal:  Positive for nausea. Negative for abdominal pain and blood in stool.   Endocrine: Negative for cold intolerance and heat intolerance.   Genitourinary:  Negative for difficulty urinating and dysuria.   Musculoskeletal:  Positive for back pain. Negative for gait problem.   Skin:  Negative for rash.   Neurological:  Negative for dizziness, syncope and headaches.   Hematological:  Negative for adenopathy.   Psychiatric/Behavioral:  Positive for dysphoric mood. Negative for behavioral problems, self-injury and suicidal ideas. The patient is nervous/anxious.        Past Medical History:   Diagnosis Date   • Allergic    • Anxiety     take lamictal for mood stabilization   • Bipolar affective disorder (HCC)    • Chronic headaches      Past Surgical History:   Procedure Laterality Date   • TONSILLECTOMY       Family History   Problem Relation Age of Onset   • Depression Mother    • Breast cancer Other    • Colon cancer Neg Hx    • Ovarian cancer Neg Hx    • Uterine cancer Neg Hx      Social History     Socioeconomic History   • Marital status: Single     Spouse name: None   • Number of children: None   • Years of education: None   • Highest education level: None   Occupational History   • None   Tobacco Use   • Smoking status: Never   • Smokeless tobacco: Never   Vaping Use   • Vaping status: Never Used   Substance and Sexual Activity   • Alcohol use: Never   • Drug use: Never   • Sexual activity: Yes     Partners: Female      Birth control/protection: None     Comment: one partner; 2 mo relationship   Other Topics Concern   • None   Social History Narrative   • None     Social Determinants of Health     Financial Resource Strain: Not on file   Food Insecurity: Not on file   Transportation Needs: Not on file   Physical Activity: Not on file   Stress: Not on file   Social Connections: Not on file   Intimate Partner Violence: Not on file   Housing Stability: Not on file     Current Outpatient Medications on File Prior to Visit   Medication Sig   • ferrous sulfate 324 (65 Fe) mg Take 1 tablet (324 mg total) by mouth every other day   • FLUoxetine (PROzac) 10 mg capsule Take 1 capsule (10 mg total) by mouth daily   • ibuprofen (MOTRIN) 800 mg tablet Take 1 tablet (800 mg total) by mouth every 8 (eight) hours as needed for mild pain   • [DISCONTINUED] lamoTRIgine (LaMICtal) 25 mg tablet Take 1 tablet (25 mg total) by mouth daily   • ascorbic acid (VITAMIN C) 500 MG TBCR Take 1 tablet (500 mg total) by mouth daily (Patient not taking: Reported on 1/11/2024)     Allergies   Allergen Reactions   • No Active Allergies      Immunization History   Administered Date(s) Administered   • COVID-19 PFIZER VACCINE 0.3 ML IM 07/28/2021, 08/18/2021   • DTaP 02/07/2005, 05/08/2005, 08/06/2005, 02/21/2008, 08/31/2010   • HPV9 04/13/2018, 11/14/2019   • Hep A, ped/adol, 2 dose 01/12/2016, 04/13/2018   • Hep B, Adolescent or Pediatric 02/07/2005, 06/11/2005, 01/12/2016   • Hepatitis A 01/12/2016, 04/13/2018   • Hib (PRP-T) 03/04/2006   • IPV 02/07/2005, 05/08/2005, 06/02/2006, 08/21/2010   • Influenza, seasonal, injectable 01/03/2013   • MMR 03/04/2006, 08/31/2010   • Meningococcal MCV4, Unspecified 01/12/2016   • Meningococcal MCV4P 01/12/2016, 02/25/2021   • Tdap 01/12/2016   • Varicella 11/28/2006, 08/31/2010       Objective     /64 (BP Location: Left arm, Patient Position: Sitting, Cuff Size: Adult)   Pulse 91   Temp (!) 97.4 °F (36.3 °C)  "(Tympanic)   Resp 16   Ht 5' 2\" (1.575 m)   Wt 54.4 kg (120 lb)   SpO2 99%   BMI 21.95 kg/m²     Physical Exam  Vitals and nursing note reviewed.   Constitutional:       General: Gus is not in acute distress.     Appearance: Normal appearance. Gus is not ill-appearing.   HENT:      Head: Normocephalic and atraumatic.      Right Ear: External ear normal.      Left Ear: External ear normal.   Eyes:      Conjunctiva/sclera: Conjunctivae normal.   Cardiovascular:      Rate and Rhythm: Normal rate and regular rhythm.      Heart sounds: Normal heart sounds.   Pulmonary:      Effort: Pulmonary effort is normal.      Breath sounds: Normal breath sounds.   Abdominal:      General: Bowel sounds are normal.      Palpations: Abdomen is soft.      Tenderness: There is abdominal tenderness in the epigastric area.   Musculoskeletal:         General: Normal range of motion.      Cervical back: Normal range of motion.   Skin:     General: Skin is warm and dry.   Neurological:      Mental Status: Gus is alert and oriented to person, place, and time.   Psychiatric:         Mood and Affect: Mood normal.         Behavior: Behavior normal.       MEAGAN Kapoor    "

## 2024-03-06 NOTE — ASSESSMENT & PLAN NOTE
- Not well controlled.  - Will increase Lamictal to 50 mg daily.   - Continue Prozac 10 mg daily.   - She is trying to get in to see Psychiatrist.  - Recommend follow up in 1 month.

## 2024-03-06 NOTE — ASSESSMENT & PLAN NOTE
- Prescription sent for meloxicam as needed. Discussed side effects.  - Recommend home exercises.  - Will continue to monitor.

## 2024-03-17 DIAGNOSIS — F41.9 ANXIETY: ICD-10-CM

## 2024-03-17 RX ORDER — FLUOXETINE 10 MG/1
10 CAPSULE ORAL DAILY
Qty: 30 CAPSULE | Refills: 5 | Status: SHIPPED | OUTPATIENT
Start: 2024-03-17

## 2024-04-03 ENCOUNTER — OFFICE VISIT (OUTPATIENT)
Dept: FAMILY MEDICINE CLINIC | Facility: CLINIC | Age: 19
End: 2024-04-03
Payer: COMMERCIAL

## 2024-04-03 VITALS
BODY MASS INDEX: 22.45 KG/M2 | OXYGEN SATURATION: 99 % | HEART RATE: 95 BPM | TEMPERATURE: 98.3 F | WEIGHT: 122 LBS | DIASTOLIC BLOOD PRESSURE: 70 MMHG | SYSTOLIC BLOOD PRESSURE: 118 MMHG | RESPIRATION RATE: 16 BRPM | HEIGHT: 62 IN

## 2024-04-03 DIAGNOSIS — K21.9 GASTROESOPHAGEAL REFLUX DISEASE, UNSPECIFIED WHETHER ESOPHAGITIS PRESENT: Primary | ICD-10-CM

## 2024-04-03 DIAGNOSIS — F31.9 DEPRESSED BIPOLAR DISORDER (HCC): ICD-10-CM

## 2024-04-03 DIAGNOSIS — F41.9 ANXIETY: ICD-10-CM

## 2024-04-03 PROCEDURE — 99214 OFFICE O/P EST MOD 30 MIN: CPT | Performed by: NURSE PRACTITIONER

## 2024-04-03 RX ORDER — LAMOTRIGINE 25 MG/1
50 TABLET ORAL DAILY
Qty: 180 TABLET | Refills: 0 | Status: SHIPPED | OUTPATIENT
Start: 2024-04-03 | End: 2024-07-02

## 2024-04-03 RX ORDER — PANTOPRAZOLE SODIUM 20 MG/1
20 TABLET, DELAYED RELEASE ORAL
Qty: 90 TABLET | Refills: 0 | Status: SHIPPED | OUTPATIENT
Start: 2024-04-03 | End: 2024-07-02

## 2024-04-03 NOTE — ASSESSMENT & PLAN NOTE
- Improving.   - Continue Lamictal 50 mg daily and Prozac 10 mg daily.   - Recommend follow up in 3 months.

## 2024-04-03 NOTE — PROGRESS NOTES
Name: Fannie Yap      : 2005      MRN: [id number]  Encounter Provider: MEAGAN Kapoor  Encounter Date: 4/3/2024   Encounter department: [facility] [address] [facility]    Assessment & Plan     1. Gastroesophageal reflux disease, unspecified whether esophagitis present  Assessment & Plan:  - Improving.   - Continue pantoprazole 20 mg daily.   - Avoid triggering food and beverage.  - Will continue to monitor.     Orders:  -     pantoprazole (PROTONIX) 20 mg tablet; Take 1 tablet (20 mg total) by mouth daily before breakfast    2. Depressed bipolar disorder (HCC)  Assessment & Plan:  - Improving.   - Continue Lamictal 50 mg daily and Prozac 10 mg daily.   - Recommend follow up in 3 months.     Orders:  -     lamoTRIgine (LaMICtal) 25 mg tablet; Take 2 tablets (50 mg total) by mouth daily    3. Anxiety  Assessment & Plan:  - Stable on Prozac 10 mg daily. Continue same.   - Will continue to monitor.            Subjective     Patient presents to office today for follow up of GERD, anxiety, and depression. She was started on pantoprazole 20 mg daily which she states has been improving her symptoms. Her lamictal was increased to 50 mg daily. She feels much better on this dose. She continues to take Prozac 10 mg daily. Denies any other concerns or complaints today.              Review of Systems   Constitutional:  Negative for fatigue and fever.   HENT:  Negative for trouble swallowing.    Eyes:  Negative for visual disturbance.   Respiratory:  Negative for cough and shortness of breath.    Cardiovascular:  Negative for chest pain and palpitations.   Gastrointestinal:  Negative for abdominal pain and blood in stool.   Endocrine: Negative for cold intolerance and heat intolerance.   Genitourinary:  Negative for difficulty urinating and dysuria.   Musculoskeletal:  Negative for gait problem.   Skin:  Negative for rash.   Neurological:  Negative for dizziness, syncope and headaches.   Hematological:   Negative for adenopathy.   Psychiatric/Behavioral:  Negative for behavioral problems.        Past Medical History:   Diagnosis Date   • Allergic    • Anxiety     take lamictal for mood stabilization   • Bipolar affective disorder (HCC)    • Chronic headaches      Past Surgical History:   Procedure Laterality Date   • TONSILLECTOMY       Family History   Problem Relation Age of Onset   • Depression Mother    • Breast cancer Other    • Colon cancer Neg Hx    • Ovarian cancer Neg Hx    • Uterine cancer Neg Hx      Social History     Socioeconomic History   • Marital status: Single     Spouse name: None   • Number of children: None   • Years of education: None   • Highest education level: None   Occupational History   • None   Tobacco Use   • Smoking status: Never   • Smokeless tobacco: Never   Vaping Use   • Vaping status: Never Used   Substance and Sexual Activity   • Alcohol use: Never   • Drug use: Never   • Sexual activity: Yes     Partners: Female     Birth control/protection: None     Comment: one partner; 2 mo relationship   Other Topics Concern   • None   Social History Narrative   • None     Social Determinants of Health     Financial Resource Strain: Not on file   Food Insecurity: Not on file   Transportation Needs: Not on file   Physical Activity: Not on file   Stress: Not on file   Social Connections: Not on file   Intimate Partner Violence: Not on file   Housing Stability: Not on file     Current Outpatient Medications on File Prior to Visit   Medication Sig   • ferrous sulfate 324 (65 Fe) mg Take 1 tablet (324 mg total) by mouth every other day   • FLUoxetine (PROzac) 10 mg capsule Take 1 capsule (10 mg total) by mouth daily   • ibuprofen (MOTRIN) 800 mg tablet Take 1 tablet (800 mg total) by mouth every 8 (eight) hours as needed for mild pain   • meloxicam (MOBIC) 7.5 mg tablet Take 1 tablet (7.5 mg total) by mouth daily as needed for moderate pain   • [DISCONTINUED] lamoTRIgine (LaMICtal) 25 mg tablet  "Take 2 tablets (50 mg total) by mouth daily   • [DISCONTINUED] pantoprazole (PROTONIX) 20 mg tablet Take 1 tablet (20 mg total) by mouth daily before breakfast   • ascorbic acid (VITAMIN C) 500 MG TBCR Take 1 tablet (500 mg total) by mouth daily (Patient not taking: Reported on 1/11/2024)     Allergies   Allergen Reactions   • No Active Allergies      Immunization History   Administered Date(s) Administered   • COVID-19 PFIZER VACCINE 0.3 ML IM 07/28/2021, 08/18/2021   • DTaP 02/07/2005, 05/08/2005, 08/06/2005, 02/21/2008, 08/31/2010   • HPV9 04/13/2018, 11/14/2019   • Hep A, ped/adol, 2 dose 01/12/2016, 04/13/2018   • Hep B, Adolescent or Pediatric 02/07/2005, 06/11/2005, 01/12/2016   • Hepatitis A 01/12/2016, 04/13/2018   • Hib (PRP-T) 03/04/2006   • IPV 02/07/2005, 05/08/2005, 06/02/2006, 08/21/2010   • Influenza, seasonal, injectable 01/03/2013   • MMR 03/04/2006, 08/31/2010   • Meningococcal MCV4, Unspecified 01/12/2016   • Meningococcal MCV4P 01/12/2016, 02/25/2021   • Tdap 01/12/2016   • Varicella 11/28/2006, 08/31/2010       Objective     /70 (BP Location: Left arm, Patient Position: Sitting, Cuff Size: Adult)   Pulse 95   Temp 98.3 °F (36.8 °C) (Tympanic)   Resp 16   Ht 5' 2\" (1.575 m)   Wt 55.3 kg (122 lb)   SpO2 99%   BMI 22.31 kg/m²     Physical Exam  Vitals and nursing note reviewed.   Constitutional:       Appearance: Normal appearance.   HENT:      Head: Normocephalic and atraumatic.      Right Ear: External ear normal.      Left Ear: External ear normal.   Eyes:      Conjunctiva/sclera: Conjunctivae normal.   Cardiovascular:      Rate and Rhythm: Normal rate and regular rhythm.      Heart sounds: Normal heart sounds.   Pulmonary:      Effort: Pulmonary effort is normal.      Breath sounds: Normal breath sounds.   Musculoskeletal:         General: Normal range of motion.      Cervical back: Normal range of motion.   Skin:     General: Skin is warm and dry.   Neurological:      Mental " Status: Gus is alert and oriented to person, place, and time.   Psychiatric:         Mood and Affect: Mood normal.         Behavior: Behavior normal.       MEAGAN Kapoor

## 2024-04-03 NOTE — ASSESSMENT & PLAN NOTE
- Improving.   - Continue pantoprazole 20 mg daily.   - Avoid triggering food and beverage.  - Will continue to monitor.

## 2024-04-16 ENCOUNTER — OFFICE VISIT (OUTPATIENT)
Dept: FAMILY MEDICINE CLINIC | Facility: CLINIC | Age: 19
End: 2024-04-16
Payer: COMMERCIAL

## 2024-04-16 VITALS
BODY MASS INDEX: 23.11 KG/M2 | RESPIRATION RATE: 16 BRPM | OXYGEN SATURATION: 98 % | WEIGHT: 125.6 LBS | HEART RATE: 90 BPM | HEIGHT: 62 IN | DIASTOLIC BLOOD PRESSURE: 68 MMHG | SYSTOLIC BLOOD PRESSURE: 110 MMHG | TEMPERATURE: 98.4 F

## 2024-04-16 DIAGNOSIS — F41.9 ANXIETY: Primary | ICD-10-CM

## 2024-04-16 PROCEDURE — 99213 OFFICE O/P EST LOW 20 MIN: CPT | Performed by: NURSE PRACTITIONER

## 2024-04-16 RX ORDER — ALPRAZOLAM 0.25 MG/1
0.5 TABLET ORAL DAILY PRN
Qty: 30 TABLET | Refills: 0 | Status: SHIPPED | OUTPATIENT
Start: 2024-04-16

## 2024-04-16 RX ORDER — FLUOXETINE HYDROCHLORIDE 20 MG/1
20 CAPSULE ORAL DAILY
Qty: 90 CAPSULE | Refills: 0 | Status: SHIPPED | OUTPATIENT
Start: 2024-04-16 | End: 2024-07-15

## 2024-04-16 NOTE — ASSESSMENT & PLAN NOTE
- Not well controlled.  - Will increase Prozac to 20 mg daily.   - Prescription sent for Xanax 0.5 mg as needed. Discussed side effects.  - Recommend follow up in 1 month.

## 2024-04-16 NOTE — PROGRESS NOTES
Name: Fannie Yap      : 2005      MRN: [id number]  Encounter Provider: MEAGAN Kapoor  Encounter Date: 2024   Encounter department: [facility] [address] [address] [facility]    Assessment & Plan     1. Anxiety  Assessment & Plan:  - Not well controlled.  - Will increase Prozac to 20 mg daily.   - Prescription sent for Xanax 0.5 mg as needed. Discussed side effects.  - Recommend follow up in 1 month.     Orders:  -     ALPRAZolam (XANAX) 0.25 mg tablet; Take 2 tablets (0.5 mg total) by mouth daily as needed for anxiety  -     FLUoxetine (PROzac) 20 mg capsule; Take 1 capsule (20 mg total) by mouth daily         Subjective     Patient presents to office today with complaints of increased anxiety. States it is interfering with her job. She has been getting panic attacks to the point that she becomes very shaky. Has caused her to have to leave work early. She is currently on Prozac 10 mg daily. She denies any other concerns or complaints today.           Review of Systems   Constitutional:  Negative for fatigue and fever.   HENT:  Negative for trouble swallowing.    Eyes:  Negative for visual disturbance.   Respiratory:  Negative for cough and shortness of breath.    Cardiovascular:  Negative for chest pain and palpitations.   Gastrointestinal:  Negative for abdominal pain and blood in stool.   Endocrine: Negative for cold intolerance and heat intolerance.   Genitourinary:  Negative for difficulty urinating and dysuria.   Musculoskeletal:  Negative for gait problem.   Skin:  Negative for rash.   Neurological:  Negative for dizziness, syncope and headaches.   Hematological:  Negative for adenopathy.   Psychiatric/Behavioral:  Negative for behavioral problems. The patient is nervous/anxious.        Past Medical History:   Diagnosis Date   • Allergic    • Anxiety     take lamictal for mood stabilization   • Bipolar affective disorder (HCC)    • Chronic headaches      Past Surgical History:   Procedure  Laterality Date   • TONSILLECTOMY       Family History   Problem Relation Age of Onset   • Depression Mother    • Breast cancer Other    • Colon cancer Neg Hx    • Ovarian cancer Neg Hx    • Uterine cancer Neg Hx      Social History     Socioeconomic History   • Marital status: Single     Spouse name: None   • Number of children: None   • Years of education: None   • Highest education level: None   Occupational History   • None   Tobacco Use   • Smoking status: Never   • Smokeless tobacco: Never   Vaping Use   • Vaping status: Never Used   Substance and Sexual Activity   • Alcohol use: Never   • Drug use: Never   • Sexual activity: Yes     Partners: Female     Birth control/protection: None     Comment: one partner; 2 mo relationship   Other Topics Concern   • None   Social History Narrative   • None     Social Determinants of Health     Financial Resource Strain: Not on file   Food Insecurity: Not on file   Transportation Needs: Not on file   Physical Activity: Not on file   Stress: Not on file   Social Connections: Not on file   Intimate Partner Violence: Not on file   Housing Stability: Not on file     Current Outpatient Medications on File Prior to Visit   Medication Sig   • ferrous sulfate 324 (65 Fe) mg Take 1 tablet (324 mg total) by mouth every other day   • ibuprofen (MOTRIN) 800 mg tablet Take 1 tablet (800 mg total) by mouth every 8 (eight) hours as needed for mild pain   • lamoTRIgine (LaMICtal) 25 mg tablet Take 2 tablets (50 mg total) by mouth daily   • pantoprazole (PROTONIX) 20 mg tablet Take 1 tablet (20 mg total) by mouth daily before breakfast   • [DISCONTINUED] FLUoxetine (PROzac) 10 mg capsule Take 1 capsule (10 mg total) by mouth daily   • ascorbic acid (VITAMIN C) 500 MG TBCR Take 1 tablet (500 mg total) by mouth daily (Patient not taking: Reported on 1/11/2024)   • meloxicam (MOBIC) 7.5 mg tablet Take 1 tablet (7.5 mg total) by mouth daily as needed for moderate pain     Allergies   Allergen  "Reactions   • No Active Allergies      Immunization History   Administered Date(s) Administered   • COVID-19 PFIZER VACCINE 0.3 ML IM 07/28/2021, 08/18/2021   • DTaP 02/07/2005, 05/08/2005, 08/06/2005, 02/21/2008, 08/31/2010   • HPV9 04/13/2018, 11/14/2019   • Hep A, ped/adol, 2 dose 01/12/2016, 04/13/2018   • Hep B, Adolescent or Pediatric 02/07/2005, 06/11/2005, 01/12/2016   • Hepatitis A 01/12/2016, 04/13/2018   • Hib (PRP-T) 03/04/2006   • IPV 02/07/2005, 05/08/2005, 06/02/2006, 08/21/2010   • Influenza, seasonal, injectable 01/03/2013   • MMR 03/04/2006, 08/31/2010   • Meningococcal MCV4, Unspecified 01/12/2016   • Meningococcal MCV4P 01/12/2016, 02/25/2021   • Tdap 01/12/2016   • Varicella 11/28/2006, 08/31/2010       Objective     /68 (BP Location: Left arm, Patient Position: Sitting, Cuff Size: Standard)   Pulse 90   Temp 98.4 °F (36.9 °C) (Tympanic)   Resp 16   Ht 5' 2\" (1.575 m)   Wt 57 kg (125 lb 9.6 oz)   SpO2 98%   BMI 22.97 kg/m²     Physical Exam  Vitals and nursing note reviewed.   Constitutional:       Appearance: Normal appearance.   HENT:      Head: Normocephalic and atraumatic.      Right Ear: External ear normal.      Left Ear: External ear normal.   Eyes:      Conjunctiva/sclera: Conjunctivae normal.   Cardiovascular:      Rate and Rhythm: Normal rate and regular rhythm.      Heart sounds: Normal heart sounds.   Pulmonary:      Effort: Pulmonary effort is normal.      Breath sounds: Normal breath sounds.   Musculoskeletal:         General: Normal range of motion.      Cervical back: Normal range of motion.   Skin:     General: Skin is warm and dry.   Neurological:      Mental Status: Gus is alert and oriented to person, place, and time.   Psychiatric:         Mood and Affect: Mood normal.         Behavior: Behavior normal.       MEAGAN Kapoor    "

## 2024-04-16 NOTE — LETTER
April 16, 2024     Patient: Fannie Yap  YOB: 2005  Date of Visit: 4/16/2024      To Whom it May Concern:    Fannie Yap is under my professional care. Fannie was seen in my office on 4/16/2024. Please excuse her from work until 4/30. Please take into account prior absences which have occurred due to anxiety as that is what she is currently being seen/treated for.     If you have any questions or concerns, please don't hesitate to call.         Sincerely,          MEAGAN Kapoor        CC: No Recipients

## 2024-05-15 ENCOUNTER — OFFICE VISIT (OUTPATIENT)
Dept: FAMILY MEDICINE CLINIC | Facility: CLINIC | Age: 19
End: 2024-05-15
Payer: COMMERCIAL

## 2024-05-15 VITALS
DIASTOLIC BLOOD PRESSURE: 62 MMHG | SYSTOLIC BLOOD PRESSURE: 106 MMHG | BODY MASS INDEX: 23.55 KG/M2 | RESPIRATION RATE: 16 BRPM | TEMPERATURE: 96.9 F | OXYGEN SATURATION: 99 % | HEIGHT: 62 IN | WEIGHT: 128 LBS | HEART RATE: 84 BPM

## 2024-05-15 DIAGNOSIS — F41.9 ANXIETY: ICD-10-CM

## 2024-05-15 DIAGNOSIS — R41.840 ATTENTION DEFICIT: Primary | ICD-10-CM

## 2024-05-15 DIAGNOSIS — F31.9 DEPRESSED BIPOLAR DISORDER (HCC): ICD-10-CM

## 2024-05-15 PROCEDURE — 99214 OFFICE O/P EST MOD 30 MIN: CPT | Performed by: NURSE PRACTITIONER

## 2024-05-15 NOTE — ASSESSMENT & PLAN NOTE
- Improving.   - Continue Lamictal 50 mg daily and Prozac 10 mg daily.   - Will continue to monitor.

## 2024-05-15 NOTE — ASSESSMENT & PLAN NOTE
- Improving.   - Continue Prozac to 20 mg daily.   - Xanax 0.5 mg as needed. Discussed side effects.  - Will continue to monitor.

## 2024-05-15 NOTE — PROGRESS NOTES
Ambulatory Visit  Name: Fannie Yap      : 2005      MRN: [id number]  Encounter Provider: MEAGAN Kapoor  Encounter Date: 5/15/2024   Encounter department: [facility]    Assessment & Plan   1. Attention deficit  Assessment & Plan:  - Referred to Psychiatrist.   Orders:  -     Ambulatory referral to Psych Services; Future  2. Depressed bipolar disorder (HCC)  Assessment & Plan:  - Improving.   - Continue Lamictal 50 mg daily and Prozac 10 mg daily.   - Will continue to monitor.   3. Anxiety  Assessment & Plan:  - Improving.   - Continue Prozac to 20 mg daily.   - Xanax 0.5 mg as needed. Discussed side effects.  - Will continue to monitor.        History of Present Illness   {Disappearing Hyperlinks I Encounters * My Last Note * Since Last Visit * History :26852}  Patient with PMH of bipolar disorder and anxiety presents to office today with concerns regarding possible ADHD. States that she was seen in [facility] many years ago and diagnosed with autism and ADHD. She has always had difficulty in school. She is looking to go back to school soon and would like accommodations to allow her to take more time on tests due to her difficulty focusing. She was told she needs documentation. She contacted [facility] who told her they had no documentation on file regarding her diagnoses. She has been trying to get in to see Psych.           Review of Systems   Constitutional:  Negative for fatigue and fever.   HENT:  Negative for trouble swallowing.    Eyes:  Negative for visual disturbance.   Respiratory:  Negative for cough and shortness of breath.    Cardiovascular:  Negative for chest pain and palpitations.   Gastrointestinal:  Negative for abdominal pain and blood in stool.   Endocrine: Negative for cold intolerance and heat intolerance.   Genitourinary:  Negative for difficulty urinating and dysuria.   Musculoskeletal:  Negative for gait problem.   Skin:  Negative for rash.  Patient already given temporary supply of medication and did not FU - no future appt scheduled  Routing to response pool to call/schedule (DUE FOR FASTING PHYSICAL)    Sole Otero RN  [facility]       Neurological:  Negative for dizziness, syncope and headaches.   Hematological:  Negative for adenopathy.   Psychiatric/Behavioral:  Positive for decreased concentration. Negative for behavioral problems.      Past Medical History:   Diagnosis Date   • Allergic    • Anxiety     take lamictal for mood stabilization   • Bipolar affective disorder (HCC)    • Chronic headaches      Past Surgical History:   Procedure Laterality Date   • TONSILLECTOMY       Family History   Problem Relation Age of Onset   • Depression Mother    • Breast cancer Other    • Colon cancer Neg Hx    • Ovarian cancer Neg Hx    • Uterine cancer Neg Hx      Social History     Tobacco Use   • Smoking status: Never   • Smokeless tobacco: Never   Vaping Use   • Vaping status: Never Used   Substance and Sexual Activity   • Alcohol use: Never   • Drug use: Never   • Sexual activity: Yes     Partners: Female     Birth control/protection: None     Comment: one partner; 2 mo relationship     Current Outpatient Medications on File Prior to Visit   Medication Sig   • ALPRAZolam (XANAX) 0.25 mg tablet Take 2 tablets (0.5 mg total) by mouth daily as needed for anxiety   • FLUoxetine (PROzac) 20 mg capsule Take 1 capsule (20 mg total) by mouth daily   • ibuprofen (MOTRIN) 800 mg tablet Take 1 tablet (800 mg total) by mouth every 8 (eight) hours as needed for mild pain   • lamoTRIgine (LaMICtal) 25 mg tablet Take 2 tablets (50 mg total) by mouth daily   • meloxicam (MOBIC) 7.5 mg tablet Take 1 tablet (7.5 mg total) by mouth daily as needed for moderate pain   • pantoprazole (PROTONIX) 20 mg tablet Take 1 tablet (20 mg total) by mouth daily before breakfast   • ascorbic acid (VITAMIN C) 500 MG TBCR Take 1 tablet (500 mg total) by mouth daily (Patient not taking: Reported on 1/11/2024)   • ferrous sulfate 324 (65 Fe) mg Take 1 tablet (324 mg total) by mouth every other day (Patient not taking: Reported on 5/15/2024)     Allergies   Allergen Reactions   • No  "Active Allergies      Immunization History   Administered Date(s) Administered   • COVID-19 PFIZER VACCINE 0.3 ML IM 07/28/2021, 08/18/2021   • DTaP 02/07/2005, 05/08/2005, 08/06/2005, 02/21/2008, 08/31/2010   • HPV9 04/13/2018, 11/14/2019   • Hep A, ped/adol, 2 dose 01/12/2016, 04/13/2018   • Hep B, Adolescent or Pediatric 02/07/2005, 06/11/2005, 01/12/2016   • Hepatitis A 01/12/2016, 04/13/2018   • Hib (PRP-T) 03/04/2006   • IPV 02/07/2005, 05/08/2005, 06/02/2006, 08/21/2010   • Influenza, seasonal, injectable 01/03/2013   • MMR 03/04/2006, 08/31/2010   • Meningococcal MCV4, Unspecified 01/12/2016   • Meningococcal MCV4P 01/12/2016, 02/25/2021   • Tdap 01/12/2016   • Varicella 11/28/2006, 08/31/2010     Objective   {Disappearing Hyperlinks   Review Vitals * Enter New Vitals * Results Review * Labs * Imaging * Cardiology * Procedures * Lung Cancer Screening :87892}  /62 (BP Location: Left arm, Patient Position: Sitting, Cuff Size: Large)   Pulse 84   Temp (!) 96.9 °F (36.1 °C) (Tympanic)   Resp 16   Ht 5' 2\" (1.575 m)   Wt 58.1 kg (128 lb)   SpO2 99%   BMI 23.41 kg/m²     Physical Exam  Administrative Statements {Disappearing Hyperlinks I  Level of Service * [facility]/PCSP:64068}        "

## 2024-05-16 ENCOUNTER — TELEPHONE (OUTPATIENT)
Dept: PSYCHIATRY | Facility: CLINIC | Age: 19
End: 2024-05-16

## 2024-05-17 ENCOUNTER — TELEPHONE (OUTPATIENT)
Dept: PSYCHIATRY | Facility: CLINIC | Age: 19
End: 2024-05-17

## 2024-05-17 NOTE — TELEPHONE ENCOUNTER
Contacted patient off of Talk Therapy  wait list to verify needs of services in attempts to update list with patient preferences. spoke with patient whom stated they want to seek out psychological testing prior to scheduling talk therapy appointments.  Pt provided email below and will remain on the waitlist for the next 3 months.  Pt was advised to call office after testing is completed to determine whether to schedule or be removed.    2nd call attempt    Email:  [email or web address]

## 2024-05-17 NOTE — TELEPHONE ENCOUNTER
Patient left . Writer called patient back. Patient is seeking ADHD/Autism testing. Writer informed we do not do that at this office. Writer transferred to  for additional resources per prior note and referral.

## 2024-05-17 NOTE — TELEPHONE ENCOUNTER
Contacted patient off of Talk Therapy  wait list to verify needs of services in attempts to update list with patient preferences. LVM for patient to contact intake dept  in regards to attempt to sched appts.    1st call attempt    Insurance Verified through [facility]  [phone]   [place] Josie Parker

## 2024-06-07 ENCOUNTER — OFFICE VISIT (OUTPATIENT)
Dept: FAMILY MEDICINE CLINIC | Facility: CLINIC | Age: 19
End: 2024-06-07
Payer: COMMERCIAL

## 2024-06-07 VITALS
OXYGEN SATURATION: 99 % | BODY MASS INDEX: 23.66 KG/M2 | HEART RATE: 98 BPM | WEIGHT: 128.6 LBS | HEIGHT: 62 IN | RESPIRATION RATE: 16 BRPM | DIASTOLIC BLOOD PRESSURE: 68 MMHG | TEMPERATURE: 97.9 F | SYSTOLIC BLOOD PRESSURE: 118 MMHG

## 2024-06-07 DIAGNOSIS — G47.00 INSOMNIA, UNSPECIFIED TYPE: ICD-10-CM

## 2024-06-07 DIAGNOSIS — F31.9 DEPRESSED BIPOLAR DISORDER (HCC): ICD-10-CM

## 2024-06-07 DIAGNOSIS — F41.9 ANXIETY: ICD-10-CM

## 2024-06-07 DIAGNOSIS — K21.9 GASTROESOPHAGEAL REFLUX DISEASE, UNSPECIFIED WHETHER ESOPHAGITIS PRESENT: Primary | ICD-10-CM

## 2024-06-07 PROCEDURE — 99214 OFFICE O/P EST MOD 30 MIN: CPT | Performed by: NURSE PRACTITIONER

## 2024-06-07 RX ORDER — MECLIZINE HYDROCHLORIDE 25 MG/1
25 TABLET ORAL 3 TIMES DAILY PRN
COMMUNITY
Start: 2024-06-05 | End: 2025-06-05

## 2024-06-07 RX ORDER — FLUOXETINE HYDROCHLORIDE 20 MG/1
20 CAPSULE ORAL DAILY
Qty: 90 CAPSULE | Refills: 1 | Status: SHIPPED | OUTPATIENT
Start: 2024-06-07 | End: 2024-09-05

## 2024-06-07 RX ORDER — PROCHLORPERAZINE MALEATE 5 MG/1
5 TABLET ORAL EVERY 6 HOURS PRN
COMMUNITY
Start: 2024-06-05 | End: 2024-06-12

## 2024-06-07 RX ORDER — LAMOTRIGINE 25 MG/1
50 TABLET ORAL DAILY
Qty: 180 TABLET | Refills: 1 | Status: SHIPPED | OUTPATIENT
Start: 2024-06-07 | End: 2024-09-05

## 2024-06-07 RX ORDER — TRAZODONE HYDROCHLORIDE 50 MG/1
50 TABLET ORAL
Qty: 90 TABLET | Refills: 0 | Status: SHIPPED | OUTPATIENT
Start: 2024-06-07

## 2024-06-07 RX ORDER — PANTOPRAZOLE SODIUM 20 MG/1
20 TABLET, DELAYED RELEASE ORAL
Qty: 90 TABLET | Refills: 0 | Status: SHIPPED | OUTPATIENT
Start: 2024-06-07 | End: 2024-09-05

## 2024-06-07 NOTE — ASSESSMENT & PLAN NOTE
- Stable on Lamictal 50 mg daily and Prozac 20 mg daily. Continue same.  - Will continue to monitor.

## 2024-06-07 NOTE — ASSESSMENT & PLAN NOTE
- Continue pantoprazole 20 mg daily.    - Avoid triggering food and beverage.  - Follow up with GI.

## 2024-06-07 NOTE — ASSESSMENT & PLAN NOTE
- Not well controlled.  - Prescription sent for Trazodone 50 mg as needed at bedtime. Discussed side effects.  - Will continue to monitor.

## 2024-06-07 NOTE — PROGRESS NOTES
Ambulatory Visit  Name: Fannie Yap      : 2005      MRN: [id number]  Encounter Provider: MEAGAN Kapoor  Encounter Date: 2024   Encounter department: [facility]    Assessment & Plan   1. Gastroesophageal reflux disease, unspecified whether esophagitis present  Assessment & Plan:  - Continue pantoprazole 20 mg daily.    - Avoid triggering food and beverage.  - Follow up with GI.  Orders:  -     pantoprazole (PROTONIX) 20 mg tablet; Take 1 tablet (20 mg total) by mouth daily before breakfast  2. Depressed bipolar disorder (HCC)  Assessment & Plan:  - Stable on Lamictal 50 mg daily and Prozac 20 mg daily. Continue same.  - Will continue to monitor.   Orders:  -     lamoTRIgine (LaMICtal) 25 mg tablet; Take 2 tablets (50 mg total) by mouth daily  3. Anxiety  Assessment & Plan:  - Improving.   - Continue Prozac to 20 mg daily.   - Xanax 0.5 mg as needed. Discussed side effects.  - Will continue to monitor.   Orders:  -     FLUoxetine (PROzac) 20 mg capsule; Take 1 capsule (20 mg total) by mouth daily  4. Insomnia, unspecified type  Assessment & Plan:  - Not well controlled.  - Prescription sent for Trazodone 50 mg as needed at bedtime. Discussed side effects.  - Will continue to monitor.  Orders:  -     traZODone (DESYREL) 50 mg tablet; Take 1 tablet (50 mg total) by mouth daily at bedtime as needed for sleep         History of Present Illness   {Disappearing Hyperlinks I Encounters * My Last Note * Since Last Visit * History :14220}  Patient presents to office today for ER follow up. She was seen in the ER on  with complaints of nausea and vomiting. Labs remarkable for leukocytosis. She had CT of abdomen and pelvis which was unremarkable. She was given IV fluids and Zofran. Symptoms improved. Suspected possible gastroenteritis vs. Cannabinoid hyperemesis syndrome. States that she does smoke marijuana but has been having nausea issues since before she started smoking.  She was trialed on pantoprazole which helped her nausea. She trialed herself off and her symptoms returned so she resumed taking. She was referred to GI and plans on making an appointment. She is having trouble sleeping at night. States this is why she smokes. She denies any other concerns or complaints today.          Review of Systems   Constitutional:  Negative for fatigue and fever.   HENT:  Negative for trouble swallowing.    Eyes:  Negative for visual disturbance.   Respiratory:  Negative for cough and shortness of breath.    Cardiovascular:  Negative for chest pain and palpitations.   Gastrointestinal:  Positive for nausea. Negative for abdominal pain and blood in stool.   Endocrine: Negative for cold intolerance and heat intolerance.   Genitourinary:  Negative for difficulty urinating and dysuria.   Musculoskeletal:  Negative for gait problem.   Skin:  Negative for rash.   Neurological:  Negative for dizziness, syncope and headaches.   Hematological:  Negative for adenopathy.   Psychiatric/Behavioral:  Positive for sleep disturbance. Negative for behavioral problems, self-injury and suicidal ideas. The patient is nervous/anxious.      Past Medical History:   Diagnosis Date   • Allergic    • Anxiety     take lamictal for mood stabilization   • Bipolar affective disorder (HCC)    • Chronic headaches      Past Surgical History:   Procedure Laterality Date   • TONSILLECTOMY       Family History   Problem Relation Age of Onset   • Depression Mother    • Breast cancer Other    • Colon cancer Neg Hx    • Ovarian cancer Neg Hx    • Uterine cancer Neg Hx      Social History     Tobacco Use   • Smoking status: Never   • Smokeless tobacco: Never   Vaping Use   • Vaping status: Never Used   Substance and Sexual Activity   • Alcohol use: Never   • Drug use: Never   • Sexual activity: Yes     Partners: Female     Birth control/protection: None     Comment: one partner; 2 mo relationship     Current Outpatient Medications  on File Prior to Visit   Medication Sig   • ALPRAZolam (XANAX) 0.25 mg tablet Take 2 tablets (0.5 mg total) by mouth daily as needed for anxiety   • ibuprofen (MOTRIN) 800 mg tablet Take 1 tablet (800 mg total) by mouth every 8 (eight) hours as needed for mild pain   • meclizine (ANTIVERT) 25 mg tablet Take 25 mg by mouth Three times daily as needed   • prochlorperazine (COMPAZINE) 5 mg tablet Take 5 mg by mouth every 6 (six) hours as needed   • [DISCONTINUED] FLUoxetine (PROzac) 20 mg capsule Take 1 capsule (20 mg total) by mouth daily   • [DISCONTINUED] lamoTRIgine (LaMICtal) 25 mg tablet Take 2 tablets (50 mg total) by mouth daily   • [DISCONTINUED] pantoprazole (PROTONIX) 20 mg tablet Take 1 tablet (20 mg total) by mouth daily before breakfast   • ascorbic acid (VITAMIN C) 500 MG TBCR Take 1 tablet (500 mg total) by mouth daily (Patient not taking: Reported on 1/11/2024)   • ferrous sulfate 324 (65 Fe) mg Take 1 tablet (324 mg total) by mouth every other day (Patient not taking: Reported on 5/15/2024)   • meloxicam (MOBIC) 7.5 mg tablet Take 1 tablet (7.5 mg total) by mouth daily as needed for moderate pain     Allergies   Allergen Reactions   • No Active Allergies      Immunization History   Administered Date(s) Administered   • COVID-19 PFIZER VACCINE 0.3 ML IM 07/28/2021, 08/18/2021   • DTaP 02/07/2005, 05/08/2005, 08/06/2005, 02/21/2008, 08/31/2010   • HPV9 04/13/2018, 11/14/2019   • Hep A, ped/adol, 2 dose 01/12/2016, 04/13/2018   • Hep B, Adolescent or Pediatric 02/07/2005, 06/11/2005, 01/12/2016   • Hepatitis A 01/12/2016, 04/13/2018   • Hib (PRP-T) 03/04/2006   • IPV 02/07/2005, 05/08/2005, 06/02/2006, 08/21/2010   • Influenza, seasonal, injectable 01/03/2013   • MMR 03/04/2006, 08/31/2010   • Meningococcal MCV4, Unspecified 01/12/2016   • Meningococcal MCV4P 01/12/2016, 02/25/2021   • Tdap 01/12/2016   • Varicella 11/28/2006, 08/31/2010     Objective   {Disappearing Hyperlinks   Review Vitals * Enter  "New Vitals * Results Review * Labs * Imaging * Cardiology * Procedures * Lung Cancer Screening :49865}  /68 (BP Location: Left arm, Patient Position: Sitting, Cuff Size: Standard)   Pulse 98   Temp 97.9 °F (36.6 °C) (Tympanic)   Resp 16   Ht 5' 2\" (1.575 m)   Wt 58.3 kg (128 lb 9.6 oz)   SpO2 99%   BMI 23.52 kg/m²     Physical Exam  Vitals and nursing note reviewed.   Constitutional:       Appearance: Normal appearance.   HENT:      Head: Normocephalic and atraumatic.      Right Ear: External ear normal.      Left Ear: External ear normal.   Eyes:      Conjunctiva/sclera: Conjunctivae normal.   Cardiovascular:      Rate and Rhythm: Normal rate and regular rhythm.      Heart sounds: Normal heart sounds.   Pulmonary:      Effort: Pulmonary effort is normal.      Breath sounds: Normal breath sounds.   Musculoskeletal:         General: Normal range of motion.      Cervical back: Normal range of motion.   Skin:     General: Skin is warm and dry.   Neurological:      Mental Status: Gus is alert and oriented to person, place, and time.   Psychiatric:         Mood and Affect: Mood normal.         Behavior: Behavior normal.       Administrative Statements {Disappearing Hyperlinks I  Level of Service * [facility]/PCSP:09225}        "

## 2024-08-22 ENCOUNTER — TELEPHONE (OUTPATIENT)
Age: 19
End: 2024-08-22

## 2024-10-10 ENCOUNTER — OFFICE VISIT (OUTPATIENT)
Dept: FAMILY MEDICINE CLINIC | Facility: CLINIC | Age: 19
End: 2024-10-10
Payer: COMMERCIAL

## 2024-10-10 VITALS
OXYGEN SATURATION: 99 % | RESPIRATION RATE: 16 BRPM | TEMPERATURE: 97.7 F | WEIGHT: 127 LBS | HEIGHT: 62 IN | HEART RATE: 105 BPM | DIASTOLIC BLOOD PRESSURE: 66 MMHG | BODY MASS INDEX: 23.37 KG/M2 | SYSTOLIC BLOOD PRESSURE: 102 MMHG

## 2024-10-10 DIAGNOSIS — F90.9 ATTENTION DEFICIT HYPERACTIVITY DISORDER (ADHD), UNSPECIFIED ADHD TYPE: ICD-10-CM

## 2024-10-10 DIAGNOSIS — F31.9 DEPRESSED BIPOLAR DISORDER (HCC): Primary | ICD-10-CM

## 2024-10-10 DIAGNOSIS — K21.9 GASTROESOPHAGEAL REFLUX DISEASE, UNSPECIFIED WHETHER ESOPHAGITIS PRESENT: ICD-10-CM

## 2024-10-10 DIAGNOSIS — F41.9 ANXIETY: ICD-10-CM

## 2024-10-10 PROCEDURE — 99214 OFFICE O/P EST MOD 30 MIN: CPT | Performed by: NURSE PRACTITIONER

## 2024-10-10 RX ORDER — FLUOXETINE 10 MG/1
CAPSULE ORAL
COMMUNITY
Start: 2024-09-26 | End: 2024-10-10 | Stop reason: SDUPTHER

## 2024-10-10 RX ORDER — HYDROXYZINE PAMOATE 25 MG/1
25 CAPSULE ORAL 3 TIMES DAILY PRN
COMMUNITY
Start: 2024-09-27

## 2024-10-10 RX ORDER — ONDANSETRON 4 MG/1
4 TABLET, FILM COATED ORAL EVERY 8 HOURS PRN
COMMUNITY
Start: 2024-09-26

## 2024-10-10 RX ORDER — FLUOXETINE 10 MG/1
10 CAPSULE ORAL DAILY
Qty: 90 CAPSULE | Refills: 0 | Status: SHIPPED | OUTPATIENT
Start: 2024-10-10

## 2024-10-10 NOTE — PROGRESS NOTES
Ambulatory Visit  Name: Fannie Yap      : 2005      MRN: [id number]  Encounter Provider: MEAGAN Kapoor  Encounter Date: 10/10/2024   Encounter department: [facility]    Assessment & Plan  Depressed bipolar disorder (HCC)  - Prozac recently increased to 30 mg daily during recent hospital visit.   - Continue Lamictal 50 mg daily.   - Continue routine follow up with Psychiatrist.   Orders:    FLUoxetine (PROzac) 10 mg capsule; Take 1 capsule (10 mg total) by mouth daily    Anxiety  - Stable on Lamictal and Prozac.   - Continue follow up with Pyschiatrist.        Attention deficit hyperactivity disorder (ADHD), unspecified ADHD type  - Stable on Adderall XR 5 mg daily.   - Continue follow up with Psychiatrist.        Gastroesophageal reflux disease, unspecified whether esophagitis present  - Stable on pantoprazole 20 mg daily. Continue same.   - Avoid triggering food and beverage.  - Will continue to monitor.               History of Present Illness     Patient with PMH of bipolar disorder and anxiety presents to office today for follow up. Was seen in ER on  with suicidal ideation. Her Prozac was increased to 30 mg daily. She continues ton Lamictal 50 mg daily. She spent 11 days at [facility]. Was apparently diagnosed with ADHD. She is on Adderall XR 5 mg daily. Feels much better on this medication. She is due for follow up with her psychiatrist. She denies any other concerns or complaints today.        Review of Systems   Constitutional:  Negative for fatigue and fever.   HENT:  Negative for trouble swallowing.    Eyes:  Negative for visual disturbance.   Respiratory:  Negative for cough and shortness of breath.    Cardiovascular:  Negative for chest pain and palpitations.   Gastrointestinal:  Negative for abdominal pain and blood in stool.   Endocrine: Negative for cold intolerance and heat intolerance.   Genitourinary:  Negative for difficulty urinating and  dysuria.   Musculoskeletal:  Negative for gait problem.   Skin:  Negative for rash.   Neurological:  Negative for dizziness, syncope and headaches.   Hematological:  Negative for adenopathy.   Psychiatric/Behavioral:  Negative for behavioral problems.      Past Medical History:   Diagnosis Date    Allergic     Anxiety     take lamictal for mood stabilization    Bipolar affective disorder (HCC)     Chronic headaches      Past Surgical History:   Procedure Laterality Date    TONSILLECTOMY       Family History   Problem Relation Age of Onset    Depression Mother     Breast cancer Other     Colon cancer Neg Hx     Ovarian cancer Neg Hx     Uterine cancer Neg Hx      Social History     Tobacco Use    Smoking status: Never    Smokeless tobacco: Never   Vaping Use    Vaping status: Never Used   Substance and Sexual Activity    Alcohol use: Never    Drug use: Never    Sexual activity: Yes     Partners: Female     Birth control/protection: None     Comment: one partner; 2 mo relationship     Current Outpatient Medications on File Prior to Visit   Medication Sig    ALPRAZolam (XANAX) 0.25 mg tablet Take 2 tablets (0.5 mg total) by mouth daily as needed for anxiety    FLUoxetine (PROzac) 20 mg capsule Take 1 capsule (20 mg total) by mouth daily    hydrOXYzine pamoate (VISTARIL) 25 mg capsule Take 25 mg by mouth 3 (three) times a day as needed    ibuprofen (MOTRIN) 800 mg tablet Take 1 tablet (800 mg total) by mouth every 8 (eight) hours as needed for mild pain    lamoTRIgine (LaMICtal) 25 mg tablet Take 2 tablets (50 mg total) by mouth daily (Patient taking differently: Take 2 tablets daily)    meclizine (ANTIVERT) 25 mg tablet Take 25 mg by mouth Three times daily as needed    meloxicam (MOBIC) 7.5 mg tablet Take 1 tablet (7.5 mg total) by mouth daily as needed for moderate pain    ondansetron (ZOFRAN) 4 mg tablet Take 4 mg by mouth every 8 (eight) hours as needed    pantoprazole (PROTONIX) 20 mg tablet Take 1 tablet (20 mg  "total) by mouth daily before breakfast    traZODone (DESYREL) 50 mg tablet Take 1 tablet (50 mg total) by mouth daily at bedtime as needed for sleep    [DISCONTINUED] FLUoxetine (PROzac) 10 mg capsule     ascorbic acid (VITAMIN C) 500 MG TBCR Take 1 tablet (500 mg total) by mouth daily    ferrous sulfate 324 (65 Fe) mg Take 1 tablet (324 mg total) by mouth every other day    prochlorperazine (COMPAZINE) 5 mg tablet Take 5 mg by mouth every 6 (six) hours as needed (Patient not taking: Reported on 10/10/2024)     Allergies   Allergen Reactions    No Active Allergies      Immunization History   Administered Date(s) Administered    COVID-19 PFIZER VACCINE 0.3 ML IM 07/28/2021, 08/18/2021    DTaP 02/07/2005, 05/08/2005, 08/06/2005, 02/21/2008, 08/31/2010    HPV9 04/13/2018, 11/14/2019    Hep A, ped/adol, 2 dose 01/12/2016, 04/13/2018    Hep B, Adolescent or Pediatric 02/07/2005, 06/11/2005, 01/12/2016    Hepatitis A 01/12/2016, 04/13/2018    Hib (PRP-T) 03/04/2006    IPV 02/07/2005, 05/08/2005, 06/02/2006, 08/21/2010    Influenza, seasonal, injectable 01/03/2013    MMR 03/04/2006, 08/31/2010    Meningococcal MCV4, Unspecified 01/12/2016    Meningococcal MCV4P 01/12/2016, 02/25/2021    Tdap 01/12/2016    Varicella 11/28/2006, 08/31/2010     Objective     /66 (BP Location: Left arm, Patient Position: Sitting, Cuff Size: Adult)   Pulse 105   Temp 97.7 °F (36.5 °C) (Tympanic)   Resp 16   Ht 5' 2\" (1.575 m)   Wt 57.6 kg (127 lb)   SpO2 99%   BMI 23.23 kg/m²     Physical Exam  Vitals and nursing note reviewed.   Constitutional:       Appearance: Normal appearance.   HENT:      Head: Normocephalic and atraumatic.      Right Ear: External ear normal.      Left Ear: External ear normal.   Eyes:      Conjunctiva/sclera: Conjunctivae normal.   Cardiovascular:      Rate and Rhythm: Normal rate and regular rhythm.      Heart sounds: Normal heart sounds.   Pulmonary:      Effort: Pulmonary effort is normal.      Breath " sounds: Normal breath sounds.   Musculoskeletal:         General: Normal range of motion.      Cervical back: Normal range of motion.   Skin:     General: Skin is warm and dry.   Neurological:      Mental Status: Gus is alert and oriented to person, place, and time.   Psychiatric:         Mood and Affect: Mood normal.         Behavior: Behavior normal.

## 2024-10-10 NOTE — ASSESSMENT & PLAN NOTE
- Stable on pantoprazole 20 mg daily. Continue same.   - Avoid triggering food and beverage.  - Will continue to monitor.

## 2024-10-10 NOTE — ASSESSMENT & PLAN NOTE
- Prozac recently increased to 30 mg daily during recent hospital visit.   - Continue Lamictal 50 mg daily.   - Continue routine follow up with Psychiatrist.   Orders:    FLUoxetine (PROzac) 10 mg capsule; Take 1 capsule (10 mg total) by mouth daily

## 2025-01-21 ENCOUNTER — OFFICE VISIT (OUTPATIENT)
Dept: FAMILY MEDICINE CLINIC | Facility: CLINIC | Age: 20
End: 2025-01-21
Payer: COMMERCIAL

## 2025-01-21 VITALS
WEIGHT: 131 LBS | TEMPERATURE: 97.4 F | OXYGEN SATURATION: 99 % | HEIGHT: 62 IN | BODY MASS INDEX: 24.11 KG/M2 | RESPIRATION RATE: 16 BRPM | HEART RATE: 101 BPM | SYSTOLIC BLOOD PRESSURE: 122 MMHG | DIASTOLIC BLOOD PRESSURE: 68 MMHG

## 2025-01-21 DIAGNOSIS — Z00.00 HEALTHCARE MAINTENANCE: ICD-10-CM

## 2025-01-21 DIAGNOSIS — F41.9 ANXIETY: ICD-10-CM

## 2025-01-21 DIAGNOSIS — G47.00 INSOMNIA, UNSPECIFIED TYPE: ICD-10-CM

## 2025-01-21 DIAGNOSIS — K21.9 GASTROESOPHAGEAL REFLUX DISEASE, UNSPECIFIED WHETHER ESOPHAGITIS PRESENT: ICD-10-CM

## 2025-01-21 DIAGNOSIS — F31.9 DEPRESSED BIPOLAR DISORDER (HCC): Primary | ICD-10-CM

## 2025-01-21 PROCEDURE — 99214 OFFICE O/P EST MOD 30 MIN: CPT | Performed by: NURSE PRACTITIONER

## 2025-01-21 PROCEDURE — 99395 PREV VISIT EST AGE 18-39: CPT | Performed by: NURSE PRACTITIONER

## 2025-01-21 RX ORDER — LAMOTRIGINE 25 MG/1
50 TABLET ORAL DAILY
Qty: 180 TABLET | Refills: 1 | Status: SHIPPED | OUTPATIENT
Start: 2025-01-21 | End: 2025-04-21

## 2025-01-21 RX ORDER — PANTOPRAZOLE SODIUM 20 MG/1
20 TABLET, DELAYED RELEASE ORAL
Qty: 90 TABLET | Refills: 1 | Status: SHIPPED | OUTPATIENT
Start: 2025-01-21 | End: 2025-04-21

## 2025-01-21 NOTE — PROGRESS NOTES
Name: Fannie Yap      : 2005      MRN: [id number]  Encounter Provider: MEAGAN Kapoor  Encounter Date: 2025   Encounter department: [facility]    Assessment & Plan  Depressed bipolar disorder (HCC)  - Advised to restart Prozac and Lamictal.  - She was referred to Psychiatrist.   - Follow up here in 6 weeks.   Orders:  •  lamoTRIgine (LaMICtal) 25 mg tablet; Take 2 tablets (50 mg total) by mouth daily  •  Ambulatory referral to Psych Services; Future    Anxiety  - Advised to restart Prozac and Lamictal.  - She was referred to Psychiatrist.   - Follow up here in 6 weeks.  Orders:  •  Ambulatory referral to Psych Services; Future    Insomnia, unspecified type  - Stable on Trazodone 50 mg as needed at bedtime. Continue same.   - Will continue to monitor.        Gastroesophageal reflux disease, unspecified whether esophagitis present  - Not well controlled.  - Advised to restart pantoprazole 20 mg daily.   - Avoid triggering food and beverage.  - Follow up in 6 weeks.  - If no improvement follow up with GI.   Orders:  •  pantoprazole (PROTONIX) 20 mg tablet; Take 1 tablet (20 mg total) by mouth daily before breakfast    Healthcare maintenance  - Reviewed immunizations and screenings.  - Discussed regular dental, vision, and GYN exams.             History of Present Illness     Patient with PMH of GERD, anxiety, depression, and insomnia presents to office today for follow up. States she has not been taking her medications. She would like to restart. Also needs to be set up with new Psychiatrist. States she has been having abdominal pain and diarrhea. Denies any other concerns or complaints today.            Review of Systems   Constitutional:  Negative for fatigue and fever.   HENT:  Negative for congestion, rhinorrhea and trouble swallowing.    Eyes:  Negative for visual disturbance.   Respiratory:  Negative for cough and shortness of breath.    Cardiovascular:  Negative  for chest pain and palpitations.   Gastrointestinal:  Positive for abdominal pain and diarrhea. Negative for blood in stool.   Endocrine: Negative for cold intolerance and heat intolerance.   Genitourinary:  Negative for difficulty urinating, dysuria and frequency.   Musculoskeletal:  Negative for gait problem.   Skin:  Negative for rash.   Neurological:  Negative for dizziness, syncope and headaches.   Hematological:  Negative for adenopathy.   Psychiatric/Behavioral:  Positive for dysphoric mood. Negative for behavioral problems. The patient is nervous/anxious.      Past Medical History:   Diagnosis Date   • Allergic    • Anxiety     take lamictal for mood stabilization   • Bipolar affective disorder (HCC)    • Chronic headaches      Past Surgical History:   Procedure Laterality Date   • TONSILLECTOMY       Family History   Problem Relation Age of Onset   • Depression Mother    • Breast cancer Other    • Colon cancer Neg Hx    • Ovarian cancer Neg Hx    • Uterine cancer Neg Hx      Social History     Tobacco Use   • Smoking status: Never   • Smokeless tobacco: Never   Vaping Use   • Vaping status: Never Used   Substance and Sexual Activity   • Alcohol use: Never   • Drug use: Never   • Sexual activity: Yes     Partners: Female     Birth control/protection: None     Comment: one partner; 2 mo relationship     Current Outpatient Medications on File Prior to Visit   Medication Sig   • FLUoxetine (PROzac) 10 mg capsule Take 1 capsule (10 mg total) by mouth daily   • FLUoxetine (PROzac) 20 mg capsule Take 1 capsule (20 mg total) by mouth daily   • hydrOXYzine pamoate (VISTARIL) 25 mg capsule Take 25 mg by mouth 3 (three) times a day as needed   • ibuprofen (MOTRIN) 800 mg tablet Take 1 tablet (800 mg total) by mouth every 8 (eight) hours as needed for mild pain   • ondansetron (ZOFRAN) 4 mg tablet Take 4 mg by mouth every 8 (eight) hours as needed   • traZODone (DESYREL) 50 mg tablet Take 1 tablet (50 mg total) by  mouth daily at bedtime as needed for sleep   • [DISCONTINUED] lamoTRIgine (LaMICtal) 25 mg tablet Take 2 tablets (50 mg total) by mouth daily   • ALPRAZolam (XANAX) 0.25 mg tablet Take 2 tablets (0.5 mg total) by mouth daily as needed for anxiety (Patient not taking: Reported on 1/21/2025)   • ascorbic acid (VITAMIN C) 500 MG TBCR Take 1 tablet (500 mg total) by mouth daily (Patient not taking: Reported on 1/21/2025)   • ferrous sulfate 324 (65 Fe) mg Take 1 tablet (324 mg total) by mouth every other day (Patient not taking: Reported on 1/21/2025)   • meclizine (ANTIVERT) 25 mg tablet Take 25 mg by mouth Three times daily as needed (Patient not taking: Reported on 1/21/2025)   • meloxicam (MOBIC) 7.5 mg tablet Take 1 tablet (7.5 mg total) by mouth daily as needed for moderate pain (Patient not taking: Reported on 1/21/2025)   • prochlorperazine (COMPAZINE) 5 mg tablet Take 5 mg by mouth every 6 (six) hours as needed (Patient not taking: Reported on 10/10/2024)   • [DISCONTINUED] pantoprazole (PROTONIX) 20 mg tablet Take 1 tablet (20 mg total) by mouth daily before breakfast (Patient not taking: Reported on 1/21/2025)     Allergies   Allergen Reactions   • No Active Allergies      Immunization History   Administered Date(s) Administered   • COVID-19 PFIZER VACCINE 0.3 ML IM 07/28/2021, 08/18/2021   • DTaP 02/07/2005, 05/08/2005, 08/06/2005, 02/21/2008, 08/31/2010   • HPV9 04/13/2018, 11/14/2019   • Hep A, ped/adol, 2 dose 01/12/2016, 04/13/2018   • Hep B, Adolescent or Pediatric 02/07/2005, 06/11/2005, 01/12/2016   • Hepatitis A 01/12/2016, 04/13/2018   • Hib (PRP-T) 03/04/2006   • IPV 02/07/2005, 05/08/2005, 06/02/2006, 08/21/2010   • Influenza, seasonal, injectable 01/03/2013   • MMR 03/04/2006, 08/31/2010   • Meningococcal MCV4, Unspecified 01/12/2016   • Meningococcal MCV4P 01/12/2016, 02/25/2021   • Tdap 01/12/2016   • Varicella 11/28/2006, 08/31/2010     Objective   /68 (BP Location: Left arm, Patient  "Position: Sitting, Cuff Size: Large)   Pulse 101   Temp (!) 97.4 °F (36.3 °C) (Tympanic)   Resp 16   Ht 5' 2\" (1.575 m)   Wt 59.4 kg (131 lb)   SpO2 99%   BMI 23.96 kg/m²     Physical Exam  Vitals and nursing note reviewed.   Constitutional:       General: Gus is not in acute distress.     Appearance: Normal appearance. Gus is not ill-appearing.   HENT:      Head: Normocephalic and atraumatic.      Right Ear: Tympanic membrane, ear canal and external ear normal.      Left Ear: Tympanic membrane, ear canal and external ear normal.      Nose: Nose normal.      Mouth/Throat:      Mouth: Mucous membranes are moist.      Pharynx: No posterior oropharyngeal erythema.   Eyes:      Conjunctiva/sclera: Conjunctivae normal.      Pupils: Pupils are equal, round, and reactive to light.   Cardiovascular:      Rate and Rhythm: Normal rate and regular rhythm.      Heart sounds: Normal heart sounds.   Pulmonary:      Effort: Pulmonary effort is normal.      Breath sounds: Normal breath sounds.   Abdominal:      General: Bowel sounds are normal.      Palpations: Abdomen is soft.      Tenderness: There is abdominal tenderness in the epigastric area.   Musculoskeletal:         General: Normal range of motion.      Cervical back: Normal range of motion.   Skin:     General: Skin is warm and dry.   Neurological:      Mental Status: Gus is alert and oriented to person, place, and time.   Psychiatric:         Mood and Affect: Mood normal.         Behavior: Behavior normal.         "

## 2025-01-21 NOTE — ASSESSMENT & PLAN NOTE
- Not well controlled.  - Advised to restart pantoprazole 20 mg daily.   - Avoid triggering food and beverage.  - Follow up in 6 weeks.  - If no improvement follow up with GI.   Orders:  •  pantoprazole (PROTONIX) 20 mg tablet; Take 1 tablet (20 mg total) by mouth daily before breakfast

## 2025-01-21 NOTE — ASSESSMENT & PLAN NOTE
- Advised to restart Prozac and Lamictal.  - She was referred to Psychiatrist.   - Follow up here in 6 weeks.  Orders:  •  Ambulatory referral to Psych Services; Future

## 2025-01-21 NOTE — ASSESSMENT & PLAN NOTE
- Advised to restart Prozac and Lamictal.  - She was referred to Psychiatrist.   - Follow up here in 6 weeks.   Orders:  •  lamoTRIgine (LaMICtal) 25 mg tablet; Take 2 tablets (50 mg total) by mouth daily  •  Ambulatory referral to Psych Services; Future